# Patient Record
Sex: FEMALE | Race: WHITE | NOT HISPANIC OR LATINO | Employment: STUDENT | URBAN - METROPOLITAN AREA
[De-identification: names, ages, dates, MRNs, and addresses within clinical notes are randomized per-mention and may not be internally consistent; named-entity substitution may affect disease eponyms.]

---
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
## 2022-05-06 ENCOUNTER — TRANSCRIBE ORDERS (OUTPATIENT)
Dept: ADMINISTRATIVE | Facility: HOSPITAL | Age: 22
End: 2022-05-06

## 2022-05-06 DIAGNOSIS — Z01.818 OTHER SPECIFIED PRE-OPERATIVE EXAMINATION: Primary | ICD-10-CM

## 2022-05-13 ENCOUNTER — LAB (OUTPATIENT)
Dept: LAB | Facility: HOSPITAL | Age: 22
End: 2022-05-13

## 2022-05-13 DIAGNOSIS — Z01.818 OTHER SPECIFIED PRE-OPERATIVE EXAMINATION: ICD-10-CM

## 2022-05-13 LAB — SARS-COV-2 ORF1AB RESP QL NAA+PROBE: NOT DETECTED

## 2022-05-13 PROCEDURE — C9803 HOPD COVID-19 SPEC COLLECT: HCPCS

## 2022-05-13 PROCEDURE — U0004 COV-19 TEST NON-CDC HGH THRU: HCPCS

## 2022-05-16 ENCOUNTER — APPOINTMENT (OUTPATIENT)
Dept: GENERAL RADIOLOGY | Facility: HOSPITAL | Age: 22
End: 2022-05-16

## 2022-05-16 ENCOUNTER — HOSPITAL ENCOUNTER (OUTPATIENT)
Facility: HOSPITAL | Age: 22
Setting detail: SURGERY ADMIT
Discharge: HOME | End: 2022-05-16
Attending: ORTHOPAEDIC SURGERY | Admitting: ORTHOPAEDIC SURGERY

## 2022-05-16 ENCOUNTER — ANESTHESIA (OUTPATIENT)
Dept: PERIOP | Facility: HOSPITAL | Age: 22
End: 2022-05-16

## 2022-05-16 ENCOUNTER — ANESTHESIA EVENT (OUTPATIENT)
Dept: PERIOP | Facility: HOSPITAL | Age: 22
End: 2022-05-16

## 2022-05-16 VITALS
TEMPERATURE: 98.8 F | BODY MASS INDEX: 41.98 KG/M2 | WEIGHT: 251.99 LBS | DIASTOLIC BLOOD PRESSURE: 78 MMHG | RESPIRATION RATE: 16 BRPM | OXYGEN SATURATION: 99 % | SYSTOLIC BLOOD PRESSURE: 129 MMHG | HEART RATE: 90 BPM | HEIGHT: 65 IN

## 2022-05-16 DIAGNOSIS — M19.072 ARTHRITIS OF ANKLE, LEFT: Primary | ICD-10-CM

## 2022-05-16 PROCEDURE — C1713 ANCHOR/SCREW BN/BN,TIS/BN: HCPCS | Performed by: ORTHOPAEDIC SURGERY

## 2022-05-16 PROCEDURE — 73600 X-RAY EXAM OF ANKLE: CPT | Performed by: ORTHOPAEDIC SURGERY

## 2022-05-16 PROCEDURE — 25010000002 DEXAMETHASONE PER 1 MG: Performed by: ANESTHESIOLOGY

## 2022-05-16 PROCEDURE — 25010000002 NEOSTIGMINE 5 MG/10ML SOLUTION: Performed by: NURSE ANESTHETIST, CERTIFIED REGISTERED

## 2022-05-16 PROCEDURE — 25010000002 KETOROLAC TROMETHAMINE PER 15 MG: Performed by: ANESTHESIOLOGY

## 2022-05-16 PROCEDURE — 25010000002 FENTANYL CITRATE (PF) 50 MCG/ML SOLUTION: Performed by: ANESTHESIOLOGY

## 2022-05-16 PROCEDURE — 76000 FLUOROSCOPY <1 HR PHYS/QHP: CPT | Performed by: ORTHOPAEDIC SURGERY

## 2022-05-16 PROCEDURE — 25010000002 ONDANSETRON PER 1 MG: Performed by: NURSE ANESTHETIST, CERTIFIED REGISTERED

## 2022-05-16 PROCEDURE — 25010000002 HYDROMORPHONE PER 4 MG: Performed by: ANESTHESIOLOGY

## 2022-05-16 PROCEDURE — 25010000002 PROPOFOL 10 MG/ML EMULSION: Performed by: NURSE ANESTHETIST, CERTIFIED REGISTERED

## 2022-05-16 PROCEDURE — 25010000002 VANCOMYCIN 1 G RECONSTITUTED SOLUTION: Performed by: ORTHOPAEDIC SURGERY

## 2022-05-16 PROCEDURE — 25010000002 HYDROMORPHONE PER 4 MG: Performed by: NURSE ANESTHETIST, CERTIFIED REGISTERED

## 2022-05-16 PROCEDURE — 81025 URINE PREGNANCY TEST: CPT | Performed by: ANESTHESIOLOGY

## 2022-05-16 PROCEDURE — 25010000002 MIDAZOLAM PER 1 MG: Performed by: ANESTHESIOLOGY

## 2022-05-16 PROCEDURE — 25010000002 CEFAZOLIN IN DEXTROSE 2-4 GM/100ML-% SOLUTION: Performed by: ORTHOPAEDIC SURGERY

## 2022-05-16 PROCEDURE — 76942 ECHO GUIDE FOR BIOPSY: CPT | Performed by: ORTHOPAEDIC SURGERY

## 2022-05-16 PROCEDURE — 25010000002 FENTANYL CITRATE (PF) 50 MCG/ML SOLUTION: Performed by: NURSE ANESTHETIST, CERTIFIED REGISTERED

## 2022-05-16 PROCEDURE — 25010000002 ROPIVACAINE PER 1 MG: Performed by: ANESTHESIOLOGY

## 2022-05-16 PROCEDURE — 25010000002 DEXAMETHASONE PER 1 MG: Performed by: NURSE ANESTHETIST, CERTIFIED REGISTERED

## 2022-05-16 DEVICE — IMPLANTABLE DEVICE
Type: IMPLANTABLE DEVICE | Site: ANKLE | Status: FUNCTIONAL
Brand: ORTHOLOC 3DI

## 2022-05-16 DEVICE — IMPLANTABLE DEVICE
Type: IMPLANTABLE DEVICE | Site: ANKLE | Status: FUNCTIONAL
Brand: DARCO

## 2022-05-16 DEVICE — INJECTABLE KIT
Type: IMPLANTABLE DEVICE | Site: ANKLE | Status: FUNCTIONAL
Brand: AUGMENT® INJECTABLE

## 2022-05-16 DEVICE — ALLOGRFT FIBR OSTEOAMP SELECT 2.5CC: Type: IMPLANTABLE DEVICE | Site: ANKLE | Status: FUNCTIONAL

## 2022-05-16 DEVICE — IMPLANTABLE DEVICE
Type: IMPLANTABLE DEVICE | Site: ANKLE | Status: FUNCTIONAL
Brand: EASYFUSE™

## 2022-05-16 RX ORDER — SODIUM CHLORIDE 0.9 % (FLUSH) 0.9 %
3 SYRINGE (ML) INJECTION EVERY 12 HOURS SCHEDULED
Status: DISCONTINUED | OUTPATIENT
Start: 2022-05-16 | End: 2022-05-16 | Stop reason: HOSPADM

## 2022-05-16 RX ORDER — PROPOFOL 10 MG/ML
VIAL (ML) INTRAVENOUS AS NEEDED
Status: DISCONTINUED | OUTPATIENT
Start: 2022-05-16 | End: 2022-05-16 | Stop reason: SURG

## 2022-05-16 RX ORDER — GLYCOPYRROLATE 0.2 MG/ML
0.2 INJECTION INTRAMUSCULAR; INTRAVENOUS
Status: COMPLETED | OUTPATIENT
Start: 2022-05-16 | End: 2022-05-16

## 2022-05-16 RX ORDER — GLYCOPYRROLATE 0.2 MG/ML
INJECTION INTRAMUSCULAR; INTRAVENOUS AS NEEDED
Status: DISCONTINUED | OUTPATIENT
Start: 2022-05-16 | End: 2022-05-16 | Stop reason: SURG

## 2022-05-16 RX ORDER — HYDROMORPHONE HCL 110MG/55ML
PATIENT CONTROLLED ANALGESIA SYRINGE INTRAVENOUS AS NEEDED
Status: DISCONTINUED | OUTPATIENT
Start: 2022-05-16 | End: 2022-05-16 | Stop reason: SURG

## 2022-05-16 RX ORDER — KETOROLAC TROMETHAMINE 30 MG/ML
30 INJECTION, SOLUTION INTRAMUSCULAR; INTRAVENOUS ONCE
Status: COMPLETED | OUTPATIENT
Start: 2022-05-16 | End: 2022-05-16

## 2022-05-16 RX ORDER — LIDOCAINE HYDROCHLORIDE 10 MG/ML
0.5 INJECTION, SOLUTION EPIDURAL; INFILTRATION; INTRACAUDAL; PERINEURAL ONCE AS NEEDED
Status: DISCONTINUED | OUTPATIENT
Start: 2022-05-16 | End: 2022-05-16 | Stop reason: HOSPADM

## 2022-05-16 RX ORDER — ROCURONIUM BROMIDE 10 MG/ML
INJECTION, SOLUTION INTRAVENOUS AS NEEDED
Status: DISCONTINUED | OUTPATIENT
Start: 2022-05-16 | End: 2022-05-16 | Stop reason: SURG

## 2022-05-16 RX ORDER — SODIUM CHLORIDE 0.9 % (FLUSH) 0.9 %
3-10 SYRINGE (ML) INJECTION AS NEEDED
Status: DISCONTINUED | OUTPATIENT
Start: 2022-05-16 | End: 2022-05-16 | Stop reason: HOSPADM

## 2022-05-16 RX ORDER — HYDROCODONE BITARTRATE AND ACETAMINOPHEN 5; 325 MG/1; MG/1
1 TABLET ORAL ONCE AS NEEDED
Status: DISCONTINUED | OUTPATIENT
Start: 2022-05-16 | End: 2022-05-16 | Stop reason: HOSPADM

## 2022-05-16 RX ORDER — CEFAZOLIN SODIUM 2 G/100ML
2 INJECTION, SOLUTION INTRAVENOUS ONCE
Status: COMPLETED | OUTPATIENT
Start: 2022-05-16 | End: 2022-05-16

## 2022-05-16 RX ORDER — BUPIVACAINE HYDROCHLORIDE AND EPINEPHRINE 2.5; 5 MG/ML; UG/ML
INJECTION, SOLUTION EPIDURAL; INFILTRATION; INTRACAUDAL; PERINEURAL
Status: COMPLETED | OUTPATIENT
Start: 2022-05-16 | End: 2022-05-16

## 2022-05-16 RX ORDER — FENTANYL CITRATE 50 UG/ML
INJECTION, SOLUTION INTRAMUSCULAR; INTRAVENOUS AS NEEDED
Status: DISCONTINUED | OUTPATIENT
Start: 2022-05-16 | End: 2022-05-16 | Stop reason: SURG

## 2022-05-16 RX ORDER — OXYCODONE AND ACETAMINOPHEN 7.5; 325 MG/1; MG/1
1 TABLET ORAL EVERY 4 HOURS PRN
Qty: 40 TABLET | Refills: 0 | Status: SHIPPED | OUTPATIENT
Start: 2022-05-16 | End: 2023-05-16

## 2022-05-16 RX ORDER — FENTANYL CITRATE 50 UG/ML
100 INJECTION, SOLUTION INTRAMUSCULAR; INTRAVENOUS
Status: DISCONTINUED | OUTPATIENT
Start: 2022-05-16 | End: 2022-05-16 | Stop reason: HOSPADM

## 2022-05-16 RX ORDER — ONDANSETRON 2 MG/ML
INJECTION INTRAMUSCULAR; INTRAVENOUS AS NEEDED
Status: DISCONTINUED | OUTPATIENT
Start: 2022-05-16 | End: 2022-05-16 | Stop reason: SURG

## 2022-05-16 RX ORDER — ASPIRIN 325 MG
325 TABLET, DELAYED RELEASE (ENTERIC COATED) ORAL DAILY
Qty: 30 TABLET | Refills: 0 | Status: SHIPPED | OUTPATIENT
Start: 2022-05-16

## 2022-05-16 RX ORDER — VANCOMYCIN HYDROCHLORIDE 1 G/20ML
INJECTION, POWDER, LYOPHILIZED, FOR SOLUTION INTRAVENOUS AS NEEDED
Status: DISCONTINUED | OUTPATIENT
Start: 2022-05-16 | End: 2022-05-16 | Stop reason: HOSPADM

## 2022-05-16 RX ORDER — FLUMAZENIL 0.1 MG/ML
0.2 INJECTION INTRAVENOUS AS NEEDED
Status: DISCONTINUED | OUTPATIENT
Start: 2022-05-16 | End: 2022-05-16 | Stop reason: HOSPADM

## 2022-05-16 RX ORDER — SODIUM CHLORIDE, SODIUM LACTATE, POTASSIUM CHLORIDE, CALCIUM CHLORIDE 600; 310; 30; 20 MG/100ML; MG/100ML; MG/100ML; MG/100ML
9 INJECTION, SOLUTION INTRAVENOUS CONTINUOUS
Status: DISCONTINUED | OUTPATIENT
Start: 2022-05-16 | End: 2022-05-16 | Stop reason: HOSPADM

## 2022-05-16 RX ORDER — FENTANYL CITRATE 50 UG/ML
50 INJECTION, SOLUTION INTRAMUSCULAR; INTRAVENOUS
Status: DISCONTINUED | OUTPATIENT
Start: 2022-05-16 | End: 2022-05-16 | Stop reason: HOSPADM

## 2022-05-16 RX ORDER — ONDANSETRON 2 MG/ML
4 INJECTION INTRAMUSCULAR; INTRAVENOUS ONCE AS NEEDED
Status: DISCONTINUED | OUTPATIENT
Start: 2022-05-16 | End: 2022-05-16 | Stop reason: HOSPADM

## 2022-05-16 RX ORDER — DEXAMETHASONE SODIUM PHOSPHATE 10 MG/ML
INJECTION INTRAMUSCULAR; INTRAVENOUS AS NEEDED
Status: DISCONTINUED | OUTPATIENT
Start: 2022-05-16 | End: 2022-05-16 | Stop reason: SURG

## 2022-05-16 RX ORDER — HYDROMORPHONE HYDROCHLORIDE 1 MG/ML
0.5 INJECTION, SOLUTION INTRAMUSCULAR; INTRAVENOUS; SUBCUTANEOUS
Status: DISCONTINUED | OUTPATIENT
Start: 2022-05-16 | End: 2022-05-16 | Stop reason: HOSPADM

## 2022-05-16 RX ORDER — NEOSTIGMINE METHYLSULFATE 0.5 MG/ML
INJECTION, SOLUTION INTRAVENOUS AS NEEDED
Status: DISCONTINUED | OUTPATIENT
Start: 2022-05-16 | End: 2022-05-16 | Stop reason: SURG

## 2022-05-16 RX ORDER — DEXAMETHASONE SODIUM PHOSPHATE 4 MG/ML
INJECTION, SOLUTION INTRA-ARTICULAR; INTRALESIONAL; INTRAMUSCULAR; INTRAVENOUS; SOFT TISSUE
Status: COMPLETED | OUTPATIENT
Start: 2022-05-16 | End: 2022-05-16

## 2022-05-16 RX ORDER — ROPIVACAINE HYDROCHLORIDE 5 MG/ML
INJECTION, SOLUTION EPIDURAL; INFILTRATION; PERINEURAL
Status: COMPLETED | OUTPATIENT
Start: 2022-05-16 | End: 2022-05-16

## 2022-05-16 RX ORDER — LIDOCAINE HYDROCHLORIDE 20 MG/ML
INJECTION, SOLUTION INFILTRATION; PERINEURAL AS NEEDED
Status: DISCONTINUED | OUTPATIENT
Start: 2022-05-16 | End: 2022-05-16 | Stop reason: SURG

## 2022-05-16 RX ORDER — MIDAZOLAM HYDROCHLORIDE 1 MG/ML
2 INJECTION INTRAMUSCULAR; INTRAVENOUS
Status: DISCONTINUED | OUTPATIENT
Start: 2022-05-16 | End: 2022-05-16 | Stop reason: HOSPADM

## 2022-05-16 RX ORDER — HYDROCODONE BITARTRATE AND ACETAMINOPHEN 7.5; 325 MG/1; MG/1
1 TABLET ORAL EVERY 4 HOURS PRN
Status: DISCONTINUED | OUTPATIENT
Start: 2022-05-16 | End: 2022-05-16 | Stop reason: HOSPADM

## 2022-05-16 RX ORDER — MAGNESIUM HYDROXIDE 1200 MG/15ML
LIQUID ORAL AS NEEDED
Status: DISCONTINUED | OUTPATIENT
Start: 2022-05-16 | End: 2022-05-16 | Stop reason: HOSPADM

## 2022-05-16 RX ORDER — ONDANSETRON 4 MG/1
4 TABLET, FILM COATED ORAL EVERY 8 HOURS PRN
Qty: 20 TABLET | Refills: 0 | Status: SHIPPED | OUTPATIENT
Start: 2022-05-16 | End: 2022-06-15

## 2022-05-16 RX ORDER — EPHEDRINE SULFATE 50 MG/ML
5 INJECTION, SOLUTION INTRAVENOUS ONCE AS NEEDED
Status: DISCONTINUED | OUTPATIENT
Start: 2022-05-16 | End: 2022-05-16 | Stop reason: HOSPADM

## 2022-05-16 RX ADMIN — GLYCOPYRROLATE 0.2 MG: 0.2 INJECTION INTRAMUSCULAR; INTRAVENOUS at 08:59

## 2022-05-16 RX ADMIN — FENTANYL CITRATE 25 MCG: 50 INJECTION INTRAMUSCULAR; INTRAVENOUS at 10:52

## 2022-05-16 RX ADMIN — FENTANYL CITRATE 50 MCG: 0.05 INJECTION, SOLUTION INTRAMUSCULAR; INTRAVENOUS at 13:03

## 2022-05-16 RX ADMIN — LIDOCAINE HYDROCHLORIDE 100 MG: 20 INJECTION, SOLUTION INFILTRATION; PERINEURAL at 10:45

## 2022-05-16 RX ADMIN — FENTANYL CITRATE 50 MCG: 0.05 INJECTION, SOLUTION INTRAMUSCULAR; INTRAVENOUS at 09:28

## 2022-05-16 RX ADMIN — KETOROLAC TROMETHAMINE 30 MG: 30 INJECTION, SOLUTION INTRAMUSCULAR at 13:04

## 2022-05-16 RX ADMIN — SODIUM CHLORIDE, POTASSIUM CHLORIDE, SODIUM LACTATE AND CALCIUM CHLORIDE 9 ML/HR: 600; 310; 30; 20 INJECTION, SOLUTION INTRAVENOUS at 08:41

## 2022-05-16 RX ADMIN — BUPIVACAINE HYDROCHLORIDE AND EPINEPHRINE 5 ML: 2.5; 5 INJECTION, SOLUTION EPIDURAL; INFILTRATION; INTRACAUDAL; PERINEURAL at 09:38

## 2022-05-16 RX ADMIN — ONDANSETRON 4 MG: 2 INJECTION INTRAMUSCULAR; INTRAVENOUS at 12:12

## 2022-05-16 RX ADMIN — HYDROMORPHONE HYDROCHLORIDE 0.5 MG: 1 INJECTION, SOLUTION INTRAMUSCULAR; INTRAVENOUS; SUBCUTANEOUS at 13:53

## 2022-05-16 RX ADMIN — ROPIVACAINE HYDROCHLORIDE 20 ML: 5 INJECTION EPIDURAL; INFILTRATION; PERINEURAL at 09:44

## 2022-05-16 RX ADMIN — GLYCOPYRROLATE 0.4 MG: 0.2 INJECTION INTRAMUSCULAR; INTRAVENOUS at 12:15

## 2022-05-16 RX ADMIN — HYDROMORPHONE HYDROCHLORIDE 0.25 MG: 2 INJECTION, SOLUTION INTRAMUSCULAR; INTRAVENOUS; SUBCUTANEOUS at 12:22

## 2022-05-16 RX ADMIN — PROPOFOL 250 MG: 10 INJECTION, EMULSION INTRAVENOUS at 10:45

## 2022-05-16 RX ADMIN — FENTANYL CITRATE 25 MCG: 50 INJECTION INTRAMUSCULAR; INTRAVENOUS at 11:05

## 2022-05-16 RX ADMIN — HYDROMORPHONE HYDROCHLORIDE 0.25 MG: 2 INJECTION, SOLUTION INTRAMUSCULAR; INTRAVENOUS; SUBCUTANEOUS at 12:28

## 2022-05-16 RX ADMIN — ROCURONIUM BROMIDE 35 MG: 50 INJECTION INTRAVENOUS at 10:45

## 2022-05-16 RX ADMIN — MIDAZOLAM 2 MG: 1 INJECTION, SOLUTION INTRAMUSCULAR; INTRAVENOUS at 09:28

## 2022-05-16 RX ADMIN — DEXAMETHASONE SODIUM PHOSPHATE 8 MG: 10 INJECTION INTRAMUSCULAR; INTRAVENOUS at 10:49

## 2022-05-16 RX ADMIN — FENTANYL CITRATE 50 MCG: 0.05 INJECTION, SOLUTION INTRAMUSCULAR; INTRAVENOUS at 13:20

## 2022-05-16 RX ADMIN — BUPIVACAINE HYDROCHLORIDE AND EPINEPHRINE BITARTRATE 10 ML: 2.5; .005 INJECTION, SOLUTION EPIDURAL; INFILTRATION; INTRACAUDAL; PERINEURAL at 09:44

## 2022-05-16 RX ADMIN — FENTANYL CITRATE 50 MCG: 50 INJECTION INTRAMUSCULAR; INTRAVENOUS at 10:44

## 2022-05-16 RX ADMIN — NEOSTIGMINE METHYLSULFATE 3 MG: 0.5 INJECTION INTRAVENOUS at 12:15

## 2022-05-16 RX ADMIN — DEXAMETHASONE SODIUM PHOSPHATE 4 MG: 4 INJECTION, SOLUTION INTRAMUSCULAR; INTRAVENOUS at 09:44

## 2022-05-16 RX ADMIN — HYDROCODONE BITARTRATE AND ACETAMINOPHEN 1 TABLET: 7.5; 325 TABLET ORAL at 13:35

## 2022-05-16 RX ADMIN — CEFAZOLIN SODIUM 2 G: 2 INJECTION, SOLUTION INTRAVENOUS at 10:37

## 2022-05-16 RX ADMIN — ROPIVACAINE HYDROCHLORIDE 10 ML: 5 INJECTION, SOLUTION EPIDURAL; INFILTRATION; PERINEURAL at 09:38

## 2022-05-16 NOTE — ANESTHESIA POSTPROCEDURE EVALUATION
Patient: Kyung Collazo    Procedure Summary     Date: 05/16/22 Room / Location:  [facility]  [facility]    Anesthesia Start: 1040 Anesthesia Stop: 1243    Procedure: LEFT ANKLE ARTHRODESIS, CALCANEL BONE GRAFT (Left Ankle) Diagnosis:     Surgeons: Joni Cho Jr., MD Provider: Joni Esparza MD    Anesthesia Type: general ASA Status: 3          Anesthesia Type: general    Vitals  Vitals Value Taken Time   /75 05/16/22 1401   Temp 36.9 °C (98.4 °F) 05/16/22 1240   Pulse 79 05/16/22 1408   Resp 16 05/16/22 1400   SpO2 98 % 05/16/22 1409   Vitals shown include unvalidated device data.        Post Anesthesia Care and Evaluation    Patient location during evaluation: bedside  Patient participation: complete - patient participated  Level of consciousness: awake  Pain management: adequate  Airway patency: patent  Anesthetic complications: No anesthetic complications  PONV Status: controlled  Cardiovascular status: acceptable  Respiratory status: acceptable  Hydration status: acceptable    Comments: --------------------            05/16/22               1400     --------------------   BP:       102/75     Pulse:     103       Resp:       16       Temp:                SpO2:      97%      --------------------

## 2022-05-16 NOTE — ANESTHESIA PROCEDURE NOTES
Airway  Urgency: elective    Date/Time: 5/16/2022 10:48 AM  Airway not difficult    General Information and Staff    Patient location during procedure: OR  Anesthesiologist: Joni Esparza MD  CRNA/CAA: Maria Luisa Hutchison CRNA    Indications and Patient Condition  Indications for airway management: airway protection    Preoxygenated: yes  MILS not maintained throughout  Mask difficulty assessment: 2 - vent by mask + OA or adjuvant +/- NMBA    Final Airway Details  Final airway type: endotracheal airway      Successful airway: ETT  Cuffed: yes   Successful intubation technique: direct laryngoscopy  Facilitating devices/methods: intubating stylet  Endotracheal tube insertion site: oral  Blade: Esteban  ETT size (mm): 7.0  Cormack-Lehane Classification: grade I - full view of glottis  Placement verified by: chest auscultation and capnometry   Measured from: lips  ETT/EBT  to lips (cm): 22  Number of attempts at approach: 1  Assessment: lips, teeth, and gum same as pre-op and atraumatic intubation

## 2022-05-16 NOTE — DISCHARGE INSTRUCTIONS
What to expect after a Nerve Block    Nerve blocks administered to block pain affect many types of nerves, including those nerves that control movement, pain, and normal sensation. Following a nerve block, you may notice some bruising at the site where the block was given. You may experience sensations such as: numbness of the affected area or limb, tingling, heaviness (that is the limb feels heavy to you), weakness or inability to move the affected arm or leg, or a feeling as if your arm or leg has “fallen asleep.”     A nerve block can last from 2 to 36 hours depending on the medications used.  Usually the weakness wears off first followed by the tingling and heaviness. As the block wears off, you may begin to notice pain; however, this sequence of events may occur in any order. Typically, you will be able to move your limb before you will feel it. Once a nerve block begins to wear off, the effects are usually completely gone within 60 minutes.  If you experience continued side effects that you believe are block related for longer than 48 hours, please call your healthcare provider. Please see block-specific instructions below.    Instructions for any Block involving the leg/foot:   If you have had a leg /foot block, you should not bear weight on the affected leg until the block has worn off. After the block has worn off, weight bearing should be as directed by your surgeon. You may be sent home with crutches. You are at high risk for falling because of the anesthetic effects on your leg. Please use caution when standing or trying to move or walk. Have someone assist you until your leg and foot function have returned to normal.     Protection of a “blocked” limb  After a nerve block, you cannot feel pain, pressure, or extremes of temperature in the affected limb. And because of this, your blocked limb is at more risk for injury. For example, it is possible to burn your limb on an extremely hot surface without  feeling it.     When resting, it is important to reposition your limb periodically to avoid prolonged pressure on it. This may require the use of pillows and padding.    While sleeping, you should avoid rolling onto the affected limb or putting too much pressure on it.     If you have a cast or tight dressing, check the color of your fingers or toes of the affected limb. Call your surgeon if they look discolored (that is, dusky, dark colored).    Use caution in cold weather. Cover your limb appropriately to protect it from the cold.    Pain Management:  Your surgeon will give you a prescription for pain medication. Begin taking this before the nerve block wears off. Bear in mind that sometimes the block can wear off in the middle of the night.

## 2022-05-16 NOTE — ANESTHESIA PREPROCEDURE EVALUATION
Anesthesia Evaluation     no history of anesthetic complications:  NPO Solid Status: > 8 hours  NPO Liquid Status: > 2 hours           Airway   Mallampati: III  Neck ROM: full  no difficulty expected  Dental - normal exam     Pulmonary - normal exam   (+) asthma (allergy-induced),  (-) COPD, sleep apnea, not a smoker    PE comment: nonlabored  Cardiovascular - negative cardio ROS and normal exam    Rhythm: regular  Rate: normal    (-) hypertension, valvular problems/murmurs, past MI, CAD, dysrhythmias, angina      Neuro/Psych- negative ROS  (-) seizures, TIA, CVA  GI/Hepatic/Renal/Endo    (+) morbid obesity,    (-) GERD, liver disease, no renal disease, diabetes, no thyroid disorder    Musculoskeletal (-) negative ROS        ROS comment: Retained hardward  H/o Ankle fracture  Abdominal    Substance History      OB/GYN          Other                          Anesthesia Plan    ASA 3     general     intravenous induction     Anesthetic plan, all risks, benefits, and alternatives have been provided, discussed and informed consent has been obtained with: patient.

## 2022-05-16 NOTE — INTERVAL H&P NOTE
H&P reviewed. The patient was examined and there are no changes to the H&P.        The risks/benefits of surgery, including pain, infection, wound healing problems, need for future procedures, mal/nonunion, DVT/PE, cardiac event, and/or death were discussed, and the patient elected to proceed with surgery.

## 2022-05-16 NOTE — ANESTHESIA PROCEDURE NOTES
Peripheral Block    Pre-sedation assessment completed: 5/16/2022 9:38 AM    Patient reassessed immediately prior to procedure    Patient location during procedure: pre-op  Start time: 5/16/2022 9:38 AM  Stop time: 5/16/2022 9:43 AM  Reason for block: at surgeon's request and post-op pain management  Performed by  Anesthesiologist: Joni Esparza MD  Preanesthetic Checklist  Completed: patient identified, IV checked, site marked, risks and benefits discussed, surgical consent, monitors and equipment checked, pre-op evaluation and timeout performed  Prep:  Pt Position: supine  Sterile barriers:cap, gloves, mask and sterile barriers  Prep: ChloraPrep  Patient monitoring: blood pressure monitoring, continuous pulse oximetry and EKG  Procedure    Sedation: yes  Performed under: local infiltration  Guidance:ultrasound guided    ULTRASOUND INTERPRETATION.  Using ultrasound guidance a 22 G gauge needle was placed in close proximity to the sciatic nerve, at which point, under ultrasound guidance anesthetic was injected in the area of the nerve and spread of the anesthesia was seen on ultrasound in close proximity thereto.  There were no abnormalities seen on ultrasound; a digital image was taken; and the patient tolerated the procedure with no complications. Images:still images obtained, printed/placed on chart (U/S used to localize the nerve)    Laterality:left  Block Type:popliteal and sciatic  Injection Technique:single-shot  Needle Type:echogenic  Needle Gauge:22 G  Resistance on Injection: less than 15 psi    Medications Used: dexamethasone (DECADRON) injection, 4 mg  ropivacaine (NAROPIN) 0.5 % injection, 20 mL  bupivacaine-EPINEPHrine PF (MARCAINE w/EPI) 0.25% -[id number] injection, 10 mL  Med administered at 5/16/2022 9:44 AM      Post Assessment  Injection Assessment: negative aspiration for heme, no paresthesia on injection and incremental injection  Patient Tolerance:comfortable throughout  block  Complications:no  Additional Notes

## 2022-05-16 NOTE — H&P
[facility]   HISTORY AND PHYSICAL    Patient Name: Kyung Collazo  : 2000  MRN: [id number]  Primary Care Physician:  Katrin Staton MD  Date of admission: 2022    Subjective   Subjective     Chief Complaint: Left ankle pain    History of Present Illness     The patient is a pleasant 21-year-old female whose had progressive pain and dysfunction related to left ankle posttraumatic arthritis.  This was refractory to prolonged nonoperative management.  The patient requested surgical intervention.  She presents today for elective left ankle arthrodesis.  Please refer to office H&P for full details.  No significant change in history, exam, plan.    Review of Systems   Review of Systems:  Constitutional: Negative  HENT: Negative  Eyes: Negative  Respiratory: Negative; no shortness of breath  Cardiovascular: Negative; no current chest pain  Gastrointestinal: Negative  : Negative  Skin: Negative except as listed in HPI  Neurological: Negative, no numbness or deficits  Hematological: Negative  Muscoloskeletal: Per HPI                   Personal History     Past Medical History:   Diagnosis Date   • Allergic rhinitis    • Ankle fracture 2020   • Anxiety and depression    • Asthma     PT DENIES DIAG.. STATED IT WAS ALLERGIES   • Borderline anemia    • Presence of retained hardware        Past Surgical History:   Procedure Laterality Date   • ANKLE ARTHROSCOPY Left 2021    Procedure: ANKLE ARTHROSCOPY/DEBRIDMENT REVISION DELTOID RECONSTRUCTION WITH ALLOGRAFT;  Surgeon: Joni Cho Jr., MD;  Location: [facility];  Service: Orthopedics;  Laterality: Left;   • ANKLE OPEN REDUCTION INTERNAL FIXATION Left 2021    Procedure: LEFT REMOVAL OF HARDWARE ANKLE REVISION OPEN REDUCTION INTERNAL FIXATION SYNDESMOSIS;  Surgeon: Joni Cho Jr., MD;  Location: [facility] [place];  Service: Orthopedics;  Laterality: Left;   • ANKLE OPEN REDUCTION INTERNAL FIXATION Left 2021    Procedure: LEFT  REMOVAL OF SYNDESMOSIS SCREW REVISION OPEN REDUCTION INTERNAL FIXATION SYNDESMOSIS (30MIN);  Surgeon: Joni Cho Jr., MD;  Location: [facility];  Service: Orthopedics;  Laterality: Left;   • APPENDECTOMY     • BLADDER SURGERY      REFLUX. RESOLVED   • BLADDER SURGERY     • ORIF ANKLE FRACTURE  12/31/2020    [facility] [place]    • URETERAL REIMPLANTATION         Family History: family history includes No Known Problems in her father and mother. Otherwise pertinent FHx was reviewed and not pertinent to current issue.    Social History:  reports that she has never smoked. She has never used smokeless tobacco. She reports that she does not drink alcohol and does not use drugs.    Home Medications:  cetirizine, escitalopram, ibuprofen, and oxyCODONE-acetaminophen    Allergies:  No Known Allergies    Objective    Objective     Vitals:   Temp:  [97.9 °F (36.6 °C)] 97.9 °F (36.6 °C)  Heart Rate:  [88] 88  Resp:  [16] 16  BP: (101)/(76) 101/76    Physical Exam  Physical Exam:  Vital signs reviewed.  Constitutional:  Appears well-developed, well nourished.  HEENT:  Head: normocephalic, atraumatic  Eyes: EOMI, grossly normal.  No scleral icterus.  Neck: Normal range of motion.  Supple, no tracheal deviation.  Cardiovascular: Regular rate.  Pulmonary: Effort normal, symmetric chest expansion, no labored breathing.  Abdominal: Soft, non distended  Neurological: No gross deficits, oriented to person, place, and time.  Skin: Warm and dry  Psychiatric: Normal mood and affect  Musculoskeletal:  Left    Active ankle dorsiflexion and plantarflexion.  Sensation is intact to light touch in sural, saphenous, deep and superficial peroneal, tibial nerve distribution.  Toes are warm and well perfused with brisk capillary refill.             Assessment & Plan   Assessment / Plan     The patient is a pleasant 21-year-old female whose had progressive pain and dysfunction related to left ankle posttraumatic arthritis.  This was  refractory to prolonged nonoperative management.  The patient requested surgical intervention.  She presents today for elective left ankle arthrodesis with calcaneal bone graft.  Please refer to office H&P for full details.  No significant change in history, exam, plan.    The risks/benefits of surgery, including pain, infection, wound healing problems, need for future procedures, mal/nonunion, DVT/PE, cardiac event, and/or death were discussed, and the patient elected to proceed with surgery.      Joni Cho Jr, MD

## 2022-05-16 NOTE — NURSING NOTE
PT ARRIVED FROM PACU VERY EMOTIONAL AND TEARFUL.  PT C/O FEELING ITCHY ALL OVER, COLD RAG WAS OFFERED AND PT STATED THAT IT GAVE HER SOME RELIEF.  ONCE PT HAD CALMED DOWN, SHE EXPRESSED SHE WAS SLEEPY STILL.  HOB WAS LOWERED AND PT SLEPT FOR 30 MINUTES.

## 2022-05-16 NOTE — OP NOTE
Procedure Note    Kyung Collazo  5/16/2022    Pre-op Diagnosis:   1.  Severe left ankle posttraumatic arthritis       Post-Op Diagnosis Codes:  Same    Procedure:  1.  Left ankle arthrodesis  2.  Calcaneal bone graft, large, separate incision    Surgeon(s):  Joni Cho Jr., MD    Assistants:  None    Anesthesia: General with Block    Estimated Blood Loss: minimal    Specimens:                None      Drains: * No LDAs found *    Complications:   None apparent    Disposition:  Stable to PACU for recovery indications for procedure:    Indications for procedure:  The patient is a very pleasant 21-year-old female who developed severe posttraumatic arthritis of the left ankle following a severe ankle fracture.  This was refractory to appropriate nonoperative management.  The patient requested surgical intervention.  The above listed procedures were recommended.    Procedure in detail:  The correct patient was identified in preoperative holding.  All risks and benefits of surgery were again discussed in detail, and the patient agreed to proceed with surgery.  The operative extremity was confirmed and marked by myself.  Operative consent reviewed and confirmed to be signed by the patient and myself.    At this time, the patient was wheeled to the operative theatre and placed supine on the OR table.  SHEILA/SCD placed on nonoperative leg. Anesthesia was induced smoothly by our anesthesia colleagues.   Well padded nonsterile tourniquet placed on the upper thigh. The left lower extremity was prepped and draped in standard sterile fashion.  Appropriate presurgical timeout was performed, confirming correct patient, correct extremity, correct procedure, availability of sterile instruments/implants, and the administration of intravenous antibiotics in the form of Ancef within one hour of skin incision.      A longitudinal incision is made over the anterior ankle with a #15 blade and careful subcutaneous dissection carried  down to the retinacular layer.  Any branches of the superficial peroneal nerve are carefully identified and protected.  The tibialis anterior and EHL tendons are identified.  The retinaculum/fascia layer is opened in longitudinal fashion in the TA/EHL interval.  Full thickness medial and lateral subperiosteal flaps are elevated, osteophytes off the anterior ankle are removed with rongeur and osteotomes.  The anterior tibial suture button from her prior deltoid allograft reconstruction was removed.  The deep anterior neurovascular bundle is carefully protected throughout the case.  A distractor is placed across the ankle.  The ankle joint is exposed, revealing diffuse degenerative changes.      Residual cartilage is removed with curettes and osteotomes to expose the subchondral plate on the tibial and talar sides as well as in the medial and lateral gutters.  The joint is thoroughly irrigated out of any debris and the subchondral bone is prepared with multiple drill holes and feathering technique with a small curved osteotome.       A small oblique incision was made at the lateral heel and dissection carried down to the lateral wall of the calcaneus, avoiding any branches of the sural nerve.  A significant amount of cancellous, calcaneal bone graft was harvested via a unicortical window with the AcTreeRingd bone graft harvesting reamer system.  A piece of gelfoam sponge was placed in the harvest site of the calcaneus and this incision was closed with 3.0 Biosyn in the subcutaneous tissue and interrupted Nylon stitches in the skin.    The calcaneal bone autograft was mixed with [employer]ment and OsteoAmp bone graft and carefully placed in the arthrodesis site.    The ankle joint was reduced and preliminarily pinned in the appropriate position for arthrodesis in terms of neutral dorsiflexion-plantarflexion, neutral coronal plane alignment allowing physiologic hindfoot valgus, and proper rotation.  Intraoperative  fluoroscopy in the AP and lateral views was independently interpreted and used to confirm appropriate alignment, as well as appropriate osseous apposition without any talar translation in the sagittal plane.    A guide wire for a WMT 6.5 mm cannulated, partially-threaded screw was placed from the distal medial tibial flare into the talar body.  Fluoroscopy was used to confirm appropriate position of the guide wire in all views.  After measuring and overdrilling, a headed-screw of the appropriate length is placed, achieving excellent compression and purchase.  The anterior tibiotalar osseous surface is carefully contoured for anterior plate placement.  A [employer] EasyFuse nitinol compression staple is measured, drilled, and placed across the lateral tibiotalar joint with excellent compression and maintained alignment.      A T Ortholoc anterior-type ankle fusion plate was selected and temporarily pinned.  Fluoroscopic AP, mortise, and lateral views confirmed appropriate plate position, therefore it was fixated distally in the talus with 3.5mm locking screws and in the tibia with 4.5 mm nonlocking screws, including a 4.5 mm screw in the compression slot.  Excellent alignment was noted clinically and fluoroscopically.  Final fluoroscopic views, including AP/mortise/lateral views of the ankle revealed all hardware to be in appropriate position and of appropriate length/size.      All wounds were thoroughly irrigated and closed in layered fashion with 2-0 Vicryl in the retinaculum, 3-0 Vicryl in the subcutaneous layers, and a combination of staples and 3-0 Nylon on the skin in interrupted fashion.  The skin was cleaned and dried and a sterile dressing applied, followed by a well-padded, short leg splint (posterior slab and stirrup).  The tourniquet had been released and brisk capillary refill returned to all toes.  The patient was awoken from anesthesia without apparent complication and taken to PACU for  "recovery.    Postoperative Plan:  Weightbearing status: Non-weightbearing in the splint  DVT Prophylaxis: Aspirin 325mg daily;  Patient will be instructed to elevate as much as possible (\"toes above the nose\") and to get up and move around at least once per hour while awake to help minimize risk of blood clots.        Joni Cho Jr, MD     Date: 5/16/2022  Time: 12:53 EDT        "

## 2024-05-02 NOTE — ANESTHESIA PROCEDURE NOTES
May 2, 2024  EMPLOYEE INFORMATION: EMPLOYER INFORMATION:   NAME: [employer]   : 1973 [phone]    DATE OF INJURY/EVENT: 2024           [facility]   Treating Provider: Amanda Chin MD  Time In:  8:57 AM Time Out:  12:32 PM      DIAGNOSIS:   1. Cellulitis of right leg      STATUS: This injury is undetermined as to causation at this time.    RETURN TO WORK:                 RESTRICTIONS: Restrictions are to be followed at work and at home.  Restrictions are in effect until next follow-up visit.  Referred to the Emergency Department for further evaluation. Cannot make any determination of work status until he is further evaluated and treated.     TREATMENT PLAN:   Medications for this injury/condition: None   Referral/Consult: None  Diagnostic Testing: None       Instructions: None     NEXT RETURN VISIT:  To be determined  Thank you for the privilege of providing medical care for this injury/condition.  If there are any questions, please call the occupational health clinic at Dept: [phone].      Electronically signed on 2024 at 12:32 PM by:   Amanda Chin MD   [facility]       Peripheral Block    Pre-sedation assessment completed: 5/16/2022 9:33 AM    Patient reassessed immediately prior to procedure    Patient location during procedure: pre-op  Start time: 5/16/2022 9:33 AM  Stop time: 5/16/2022 9:38 AM  Reason for block: at surgeon's request and post-op pain management  Performed by  Anesthesiologist: Joni Esparza MD  Preanesthetic Checklist  Completed: patient identified, IV checked, site marked, risks and benefits discussed, surgical consent, monitors and equipment checked, pre-op evaluation and timeout performed  Prep:  Pt Position: supine  Sterile barriers:cap, gloves, mask and sterile barriers  Prep: ChloraPrep  Patient monitoring: blood pressure monitoring, continuous pulse oximetry and EKG  Procedure    Sedation: yes  Performed under: local infiltration  Guidance:ultrasound guided    ULTRASOUND INTERPRETATION. Using ultrasound guidance a 22 G gauge needle was placed in close proximity to the nerve, at which point, under ultrasound guidance anesthetic was injected in the area of the nerve and spread of the anesthesia was seen on ultrasound in close proximity thereto.  There were no abnormalities seen on ultrasound; a digital image was taken; and the patient tolerated the procedure with no complications. Images:still images obtained, printed/placed on chart (U/S to localize the nerve)    Laterality:left  Block Type:adductor canal block  Injection Technique:single-shot  Needle Type:echogenic  Needle Gauge:22 G  Resistance on Injection: less than 15 psi    Medications Used: ropivacaine (NAROPIN) 0.5 % injection, 10 mL  bupivacaine-EPINEPHrine PF (MARCAINE w/EPI) 0.25% -[id number] injection, 5 mL  Med administered at 5/16/2022 9:38 AM      Post Assessment  Injection Assessment: negative aspiration for heme, no paresthesia on injection and incremental injection  Patient Tolerance:comfortable throughout block  Complications:yes and no

## 2024-10-18 ENCOUNTER — OFFICE VISIT (OUTPATIENT)
Dept: FAMILY MEDICINE CLINIC | Facility: CLINIC | Age: 24
End: 2024-10-18
Payer: COMMERCIAL

## 2024-10-18 VITALS
HEART RATE: 92 BPM | OXYGEN SATURATION: 99 % | SYSTOLIC BLOOD PRESSURE: 100 MMHG | WEIGHT: 258.2 LBS | HEIGHT: 65 IN | BODY MASS INDEX: 43.02 KG/M2 | RESPIRATION RATE: 18 BRPM | DIASTOLIC BLOOD PRESSURE: 70 MMHG

## 2024-10-18 DIAGNOSIS — R41.840 ATTENTION AND CONCENTRATION DEFICIT: ICD-10-CM

## 2024-10-18 DIAGNOSIS — Z00.00 GENERAL MEDICAL EXAM: Primary | ICD-10-CM

## 2024-10-18 DIAGNOSIS — E66.01 CLASS 3 SEVERE OBESITY DUE TO EXCESS CALORIES WITHOUT SERIOUS COMORBIDITY WITH BODY MASS INDEX (BMI) OF 40.0 TO 44.9 IN ADULT: ICD-10-CM

## 2024-10-18 DIAGNOSIS — F41.9 ANXIETY: ICD-10-CM

## 2024-10-18 DIAGNOSIS — E66.813 CLASS 3 SEVERE OBESITY DUE TO EXCESS CALORIES WITHOUT SERIOUS COMORBIDITY WITH BODY MASS INDEX (BMI) OF 40.0 TO 44.9 IN ADULT: ICD-10-CM

## 2024-10-18 DIAGNOSIS — N83.202 BILATERAL OVARIAN CYSTS: ICD-10-CM

## 2024-10-18 DIAGNOSIS — N83.201 BILATERAL OVARIAN CYSTS: ICD-10-CM

## 2024-10-18 PROBLEM — G89.29 CHRONIC PAIN OF LEFT ANKLE: Status: ACTIVE | Noted: 2024-10-18

## 2024-10-18 PROBLEM — M25.572 CHRONIC PAIN OF LEFT ANKLE: Status: ACTIVE | Noted: 2024-10-18

## 2024-10-18 PROBLEM — N83.209 OVARIAN CYST: Status: ACTIVE | Noted: 2019-03-15

## 2024-10-18 RX ORDER — SEMAGLUTIDE 0.25 MG/.5ML
0.25 INJECTION, SOLUTION SUBCUTANEOUS WEEKLY
Qty: 2 ML | Refills: 0 | Status: SHIPPED | OUTPATIENT
Start: 2024-10-18

## 2024-10-18 RX ORDER — BUPROPION HYDROCHLORIDE 150 MG/1
TABLET ORAL
Qty: 120 TABLET | Refills: 0 | Status: SHIPPED | OUTPATIENT
Start: 2024-10-18 | End: 2024-12-19

## 2024-10-18 NOTE — ASSESSMENT & PLAN NOTE
First ovarian cysts at 17. Experiencing mid-cycle severe pain and heavy menstrual bleeding. Stopped OTC progestin only OCP in August after a few months - it improved bleeding but she did experience irregularity. Has to change menstrual cup 4-5 times daily on heavy cycle days.     Also experiences hirsutism.    Check testosterone, obtain pelvic ultrasound.  History does seem consistent with polycystic ovarian syndrome.

## 2024-10-18 NOTE — PROGRESS NOTES
"Chief Complaint  Annual Exam    Subjective        Kyung Collazo presents to [facility]  History of Present Illness  23-year-old female presents to clinic to establish care.  Main concern today is weight and ovarian cysts.      Objective   Vital Signs:  /70   Pulse 92   Resp 18   Ht 165.1 cm (65\")   Wt 117 kg (258 lb 3.2 oz)   SpO2 99%   BMI 42.97 kg/m²   Estimated body mass index is 42.97 kg/m² as calculated from the following:    Height as of this encounter: 165.1 cm (65\").    Weight as of this encounter: 117 kg (258 lb 3.2 oz).            Physical Exam  Constitutional:       Appearance: Normal appearance.   HENT:      Head: Normocephalic and atraumatic.      Nose: Nose normal.      Mouth/Throat:      Mouth: Mucous membranes are moist.   Eyes:      General: Lids are normal.      Conjunctiva/sclera: Conjunctivae normal.   Cardiovascular:      Rate and Rhythm: Normal rate and regular rhythm.      Heart sounds: Normal heart sounds.   Pulmonary:      Effort: Pulmonary effort is normal.      Breath sounds: Normal breath sounds.   Musculoskeletal:      Cervical back: Normal range of motion.   Skin:     General: Skin is warm and dry.   Neurological:      General: No focal deficit present.      Mental Status: She is alert and oriented to person, place, and time.      Gait: Gait is intact.   Psychiatric:         Mood and Affect: Mood normal.         Behavior: Behavior normal.         Thought Content: Thought content normal.        Result Review :                Assessment and Plan   Diagnoses and all orders for this visit:    1. General medical exam (Primary)  Assessment & Plan:  Amenable to flu, Tdap, and HPV vaccination today.  She has not had her first Pap smear.  Will screen cholesterol.    Orders:  -     Hemoglobin A1c  -     Comprehensive metabolic panel  -     Lipid Panel  -     Hepatitis C Antibody  -     HPV Vaccine (HPV9)  -     Chlamydia trachomatis, Neisseria " gonorrhoeae, PCR - Urine, Urine, Clean Catch    2. Bilateral ovarian cysts  Assessment & Plan:  First ovarian cysts at 17. Experiencing mid-cycle severe pain and heavy menstrual bleeding. Stopped OTC progestin only OCP in August after a few months - it improved bleeding but she did experience irregularity. Has to change menstrual cup 4-5 times daily on heavy cycle days.     Also experiences hirsutism.    Check testosterone, obtain pelvic ultrasound.  History does seem consistent with polycystic ovarian syndrome.    Orders:  -     US Pelvis Transvaginal Non OB; Future  -     Testosterone, Free, Total    3. Class 3 severe obesity due to excess calories without serious comorbidity with body mass index (BMI) of 40.0 to 44.9 in adult  Assessment & Plan:  Patient's (Body mass index is 42.97 kg/m².) indicates that they are morbidly/severely obese (BMI > 40 or > 35 with obesity - related health condition) with health conditions that include none . Weight is unchanged. BMI  is above average; BMI management plan is completed. We discussed portion control, increasing exercise, pharmacologic options including Wegovy, and an lorene-based approach such as [employer] Pal or Lose It.     Patient is attentive to diet, recognizes excessive intake of fast food recently, and goes to the gym regularly.  Calorie counting has been effective in the past, but has worsened concomitant mood disorders.  History of binging in the past as well.    Main reason for wanting to control the weight is her chronic left ankle pain.    Parents are doing well with Wegovy.    Discussed risk and benefits of medical therapy.  Trial Wegovy.  If any insurance pushback, she will be amenable to working with a nutritionist on nonpharmacological weight loss strategies.    Orders:  -     Hemoglobin A1c  -     Comprehensive metabolic panel  -     Lipid Panel    4. Attention and concentration deficit  Assessment & Plan:  History of anxiety, discontinued Lexapro prior to  going to college.  Patient reports that she did not feel any different with that.  She is concerned for executive dysfunction versus anxiety.  Referral placed for neuropsych testing.    In the meantime, she is interested in trialing medication for management of symptoms.  Okay to trial Wellbutrin.    Orders:  -     NeuroPsych Testing; Future    5. Anxiety    Other orders  -     Fluzone >6mos ([id number])  -     Semaglutide-Weight Management (Wegovy) 0.25 MG/0.5ML solution auto-injector; Inject 0.5 mL under the skin into the appropriate area as directed 1 (One) Time Per Week.  Dispense: 2 mL; Refill: 0  -     Tdap Vaccine => 8yo IM (BOOSTRIX/ADACEL)  -     buPROPion XL (Wellbutrin XL) 150 MG 24 hr tablet; Take 1 tablet by mouth Daily for 4 days, THEN 2 tablets Daily for 58 days.  Dispense: 120 tablet; Refill: 0             Follow Up   Return in about 4 weeks (around 11/15/2024) for Recheck - PAP.  Patient was given instructions and counseling regarding her condition or for health maintenance advice. Please see specific information pulled into the AVS if appropriate.

## 2024-10-18 NOTE — ASSESSMENT & PLAN NOTE
Patient's (Body mass index is 42.97 kg/m².) indicates that they are morbidly/severely obese (BMI > 40 or > 35 with obesity - related health condition) with health conditions that include none . Weight is unchanged. BMI  is above average; BMI management plan is completed. We discussed portion control, increasing exercise, pharmacologic options including Wegovy, and an lorene-based approach such as [employer]ur[employer] Pal or Lose It.     Patient is attentive to diet, recognizes excessive intake of fast food recently, and goes to the gym regularly.  Calorie counting has been effective in the past, but has worsened concomitant mood disorders.  History of binging in the past as well.    Main reason for wanting to control the weight is her chronic left ankle pain.    Parents are doing well with Wegovy.    Discussed risk and benefits of medical therapy.  Trial Wegovy.  If any insurance pushback, she will be amenable to working with a nutritionist on nonpharmacological weight loss strategies.

## 2024-10-18 NOTE — ASSESSMENT & PLAN NOTE
Amenable to flu, Tdap, and HPV vaccination today.  She has not had her first Pap smear.  Will screen cholesterol.

## 2024-10-18 NOTE — ASSESSMENT & PLAN NOTE
History of anxiety, discontinued Lexapro prior to going to college.  Patient reports that she did not feel any different with that.  She is concerned for executive dysfunction versus anxiety.  Referral placed for neuropsych testing.    In the meantime, she is interested in trialing medication for management of symptoms.  Okay to trial Wellbutrin.

## 2024-10-19 LAB
ALBUMIN SERPL-MCNC: 4.2 G/DL (ref 3.5–5.2)
ALBUMIN/GLOB SERPL: 1.6 G/DL
ALP SERPL-CCNC: 105 U/L (ref 39–117)
ALT SERPL-CCNC: 12 U/L (ref 1–33)
AST SERPL-CCNC: 11 U/L (ref 1–32)
BILIRUB SERPL-MCNC: 0.5 MG/DL (ref 0–1.2)
BUN SERPL-MCNC: 9 MG/DL (ref 6–20)
BUN/CREAT SERPL: 12.2 (ref 7–25)
CALCIUM SERPL-MCNC: 9.5 MG/DL (ref 8.6–10.5)
CHLORIDE SERPL-SCNC: 106 MMOL/L (ref 98–107)
CHOLEST SERPL-MCNC: 97 MG/DL (ref 0–200)
CO2 SERPL-SCNC: 23.5 MMOL/L (ref 22–29)
CREAT SERPL-MCNC: 0.74 MG/DL (ref 0.57–1)
EGFRCR SERPLBLD CKD-EPI 2021: 118.2 ML/MIN/1.73
GLOBULIN SER CALC-MCNC: 2.7 GM/DL
GLUCOSE SERPL-MCNC: 81 MG/DL (ref 65–99)
HBA1C MFR BLD: 5.1 % (ref 4.8–5.6)
HCV IGG SERPL QL IA: NON REACTIVE
HDLC SERPL-MCNC: 43 MG/DL (ref 40–60)
LDLC SERPL CALC-MCNC: 38 MG/DL (ref 0–100)
POTASSIUM SERPL-SCNC: 4.2 MMOL/L (ref 3.5–5.2)
PROT SERPL-MCNC: 6.9 G/DL (ref 6–8.5)
SODIUM SERPL-SCNC: 140 MMOL/L (ref 136–145)
TESTOST FREE SERPL-MCNC: 2.8 PG/ML (ref 0–4.2)
TESTOST SERPL-MCNC: 53 NG/DL (ref 13–71)
TRIGL SERPL-MCNC: 78 MG/DL (ref 0–150)
VLDLC SERPL CALC-MCNC: 16 MG/DL (ref 5–40)

## 2024-10-21 LAB
C TRACH RRNA SPEC QL NAA+PROBE: NEGATIVE
N GONORRHOEA RRNA SPEC QL NAA+PROBE: NEGATIVE

## 2024-11-01 ENCOUNTER — HOSPITAL ENCOUNTER (OUTPATIENT)
Dept: ULTRASOUND IMAGING | Facility: HOSPITAL | Age: 24
Discharge: HOME | End: 2024-11-01
Admitting: FAMILY MEDICINE
Payer: COMMERCIAL

## 2024-11-01 DIAGNOSIS — N83.201 BILATERAL OVARIAN CYSTS: ICD-10-CM

## 2024-11-01 DIAGNOSIS — N83.202 BILATERAL OVARIAN CYSTS: ICD-10-CM

## 2024-11-01 PROCEDURE — 76830 TRANSVAGINAL US NON-OB: CPT

## 2024-11-01 PROCEDURE — 76856 US EXAM PELVIC COMPLETE: CPT

## 2024-11-22 ENCOUNTER — OFFICE VISIT (OUTPATIENT)
Dept: FAMILY MEDICINE CLINIC | Facility: CLINIC | Age: 24
End: 2024-11-22
Payer: COMMERCIAL

## 2024-11-22 VITALS
RESPIRATION RATE: 18 BRPM | DIASTOLIC BLOOD PRESSURE: 70 MMHG | SYSTOLIC BLOOD PRESSURE: 110 MMHG | WEIGHT: 248.2 LBS | HEIGHT: 65 IN | HEART RATE: 97 BPM | BODY MASS INDEX: 41.35 KG/M2 | OXYGEN SATURATION: 99 %

## 2024-11-22 DIAGNOSIS — N89.8 VAGINAL DISCHARGE: ICD-10-CM

## 2024-11-22 DIAGNOSIS — R41.840 ATTENTION AND CONCENTRATION DEFICIT: ICD-10-CM

## 2024-11-22 DIAGNOSIS — Z12.4 SCREENING FOR CERVICAL CANCER: Primary | ICD-10-CM

## 2024-11-22 RX ORDER — AZITHROMYCIN 250 MG/1
TABLET, FILM COATED ORAL
COMMUNITY
End: 2024-11-22

## 2024-11-22 RX ORDER — ONDANSETRON 4 MG/1
TABLET, FILM COATED ORAL
COMMUNITY
End: 2024-11-22

## 2024-11-22 RX ORDER — ESCITALOPRAM OXALATE 20 MG/1
1 TABLET ORAL DAILY
COMMUNITY
End: 2024-11-22

## 2024-11-22 RX ORDER — BUPROPION HYDROCHLORIDE 100 MG/1
100 TABLET, EXTENDED RELEASE ORAL 2 TIMES DAILY
Qty: 60 TABLET | Refills: 0 | Status: SHIPPED | OUTPATIENT
Start: 2024-11-22

## 2024-11-22 NOTE — PATIENT INSTRUCTIONS
Reach out to insurance company for Nexplanon coverage - then we can order it to the clinic and get your appointment set up.

## 2024-11-25 LAB
A VAGINAE DNA VAG QL NAA+PROBE: ABNORMAL SCORE
BVAB2 DNA VAG QL NAA+PROBE: ABNORMAL SCORE
C ALBICANS DNA VAG QL NAA+PROBE: POSITIVE
C GLABRATA DNA VAG QL NAA+PROBE: NEGATIVE
C KRUSEI DNA VAG QL NAA+PROBE: NEGATIVE
C LUSITANIAE DNA VAG QL NAA+PROBE: NEGATIVE
C TRACH DNA SPEC QL NAA+PROBE: NEGATIVE
CANDIDA DNA VAG QL NAA+PROBE: NEGATIVE
MEGA1 DNA VAG QL NAA+PROBE: ABNORMAL SCORE
N GONORRHOEA DNA VAG QL NAA+PROBE: NEGATIVE
T VAGINALIS DNA VAG QL NAA+PROBE: NEGATIVE

## 2024-11-27 RX ORDER — FLUCONAZOLE 150 MG/1
150 TABLET ORAL ONCE
Qty: 1 TABLET | Refills: 0 | Status: SHIPPED | OUTPATIENT
Start: 2024-11-27 | End: 2024-11-27

## 2024-11-30 PROBLEM — Z12.4 SCREENING FOR CERVICAL CANCER: Status: ACTIVE | Noted: 2024-11-30

## 2024-11-30 PROBLEM — N89.8 VAGINAL DISCHARGE: Status: ACTIVE | Noted: 2024-11-30

## 2024-12-01 PROBLEM — R41.840 ATTENTION AND CONCENTRATION DEFICIT: Chronic | Status: ACTIVE | Noted: 2024-10-18

## 2024-12-01 NOTE — ASSESSMENT & PLAN NOTE
Noted benefit to sx with welbutrin but noted dizziness that was intolerable. Trial lower dose SR vs XL.

## 2024-12-01 NOTE — PROGRESS NOTES
"Answers submitted by the patient for this visit:  Other (Submitted on 11/15/2024)  Please describe your symptoms.: Follow up, dizziness  Have you had these symptoms before?: Yes  How long have you been having these symptoms?: Greater than 2 weeks  Please describe any probable cause for these symptoms. : New medication  Primary Reason for Visit (Submitted on 11/15/2024)  What is the primary reason for your visit?: Problem Not Listed  Chief Complaint  PAP    Subjective        Kyung Collazo presents to [facility]  History of Present Illness  25 yo f presents to clinic to f/u on welbutrin and get pap.    Welbutrin has positive effect on mood and executive functioning, but also causes intolerable side effect - dizziness.      Objective   Vital Signs:  /70   Pulse 97   Resp 18   Ht 165.1 cm (65\")   Wt 113 kg (248 lb 3.2 oz)   SpO2 99%   BMI 41.30 kg/m²   Estimated body mass index is 41.3 kg/m² as calculated from the following:    Height as of this encounter: 165.1 cm (65\").    Weight as of this encounter: 113 kg (248 lb 3.2 oz).            Physical Exam  Constitutional:       Appearance: Normal appearance.   HENT:      Head: Normocephalic and atraumatic.      Nose: Nose normal.      Mouth/Throat:      Mouth: Mucous membranes are moist.   Eyes:      General: Lids are normal.      Conjunctiva/sclera: Conjunctivae normal.   Pulmonary:      Effort: Pulmonary effort is normal.   Genitourinary:     General: Normal vulva.      Vagina: Vaginal discharge present. No lesions.      Cervix: Normal.   Musculoskeletal:      Cervical back: Normal range of motion.   Skin:     General: Skin is warm and dry.   Neurological:      General: No focal deficit present.      Mental Status: She is alert and oriented to person, place, and time.      Gait: Gait is intact.   Psychiatric:         Mood and Affect: Mood normal.         Behavior: Behavior normal.         Thought Content: Thought content " normal.        Result Review :                Assessment and Plan   Diagnoses and all orders for this visit:    1. Screening for cervical cancer (Primary)  Assessment & Plan:  Pap obtained.    Orders:  -     IGP, CtNg, Rfx Aptima HPV ASCU    2. Vaginal discharge  Assessment & Plan:  Noted on exam, pt asymptomatic.    Orders:  -     NuSwab VG+, Candida 6sp    3. Attention and concentration deficit  Assessment & Plan:  Noted benefit to sx with welbutrin but noted nausea with dosing. Trial lower dose SR vs XL.       Other orders  -     buPROPion SR (Wellbutrin SR) 100 MG 12 hr tablet; Take 1 tablet by mouth 2 (Two) Times a Day.  Dispense: 60 tablet; Refill: 0  -     fluconazole (DIFLUCAN) 150 MG tablet; Take 1 tablet by mouth 1 (One) Time for 1 dose.  Dispense: 1 tablet; Refill: 0             Follow Up   No follow-ups on file.  Patient was given instructions and counseling regarding her condition or for health maintenance advice. Please see specific information pulled into the AVS if appropriate.

## 2024-12-02 LAB
C TRACH RRNA CVX QL NAA+PROBE: NEGATIVE
CONV .: NORMAL
CYTOLOGIST CVX/VAG CYTO: NORMAL
CYTOLOGY CVX/VAG DOC CYTO: NORMAL
CYTOLOGY CVX/VAG DOC THIN PREP: NORMAL
DX ICD CODE: NORMAL
Lab: NORMAL
N GONORRHOEA RRNA CVX QL NAA+PROBE: NEGATIVE
OTHER STN SPEC: NORMAL
STAT OF ADQ CVX/VAG CYTO-IMP: NORMAL

## 2024-12-23 RX ORDER — BUPROPION HYDROCHLORIDE 100 MG/1
100 TABLET, EXTENDED RELEASE ORAL 2 TIMES DAILY
Qty: 60 TABLET | Refills: 0 | Status: SHIPPED | OUTPATIENT
Start: 2024-12-23

## 2025-01-09 ENCOUNTER — TELEPHONE (OUTPATIENT)
Dept: FAMILY MEDICINE CLINIC | Facility: CLINIC | Age: 25
End: 2025-01-09
Payer: COMMERCIAL

## 2025-01-09 NOTE — TELEPHONE ENCOUNTER
Caller: Kyung Collazo    Relationship: Self    Best call back number:     What is the best time to reach you:     Who are you requesting to speak with (clinical staff, provider,  specific staff member):     Do you know the name of the person who called:     What was the call regarding:  PATIENT WANTS TO BE CALLED AND DICUSS A BIRTH CONTROL METHOD (NEXPLANON)BEING DEEMED AS PREVENTATIVE SO THAT HER INSURANCE WILL COVER  PERCENT.

## 2025-01-10 NOTE — TELEPHONE ENCOUNTER
Will be sending out a pharmacy form to see if the nexplanon is covered under the pharmacy [facility]. Will call patient and let her know.

## 2025-01-24 ENCOUNTER — PROCEDURE VISIT (OUTPATIENT)
Dept: FAMILY MEDICINE CLINIC | Facility: CLINIC | Age: 25
End: 2025-01-24
Payer: COMMERCIAL

## 2025-01-24 VITALS
SYSTOLIC BLOOD PRESSURE: 122 MMHG | HEIGHT: 65 IN | BODY MASS INDEX: 41.84 KG/M2 | RESPIRATION RATE: 18 BRPM | DIASTOLIC BLOOD PRESSURE: 82 MMHG | OXYGEN SATURATION: 97 % | HEART RATE: 83 BPM | WEIGHT: 251.1 LBS

## 2025-01-24 DIAGNOSIS — Z30.017 NEXPLANON INSERTION: Primary | ICD-10-CM

## 2025-01-24 PROCEDURE — 11981 INSERTION DRUG DLVR IMPLANT: CPT | Performed by: FAMILY MEDICINE

## 2025-01-24 PROCEDURE — 81025 URINE PREGNANCY TEST: CPT | Performed by: FAMILY MEDICINE

## 2025-01-24 NOTE — PROGRESS NOTES
Subdermal Contraceptive Implant Insertion Note    Kyung Collazo desires a subdermal etonogestrel contraceptive implant insertion.  She has been counseled regarding the risks, benefits and alternatives to the implant.  She especially understands that her menstrual periods are expected to become irregular and unpredictable throughout the time she is using the implant.  She has no contraindications to the insertion.  Her questions have been answered.  She has fully reviewed the FDA-approved consent brochure, has signed the consent form, and wishes to proceed with the insertion today.     Current method of contraception:  no method    Patient's last menstrual period was 12/30/2024 (exact date).    Urine pregnancy test: Negative    Procedure Time Out Documentation       Procedure Details  The inner side of the left arm was cleaned with Betadinex3 and infiltrated with 1% lidocaine with epinephrine.  The contraceptive leonela was inserted according to the 's instructions without complications.  The leonela was palpable under the skin after the insertion.  The insertion site was closed with Steri-strip and a pressure dressing was applied.    Lot:  [id number]  Exp:  2027-0   [id number]    Kyung was given post-insertion instructions.  She understands that the implant must be removed at the end of three years and may be removed sooner if she wishes.    Successful insertion of implanon device.    Patient tolerated the procedure well without complications.

## 2025-01-24 NOTE — PATIENT INSTRUCTIONS
The implant starts working in 7 days to prevent pregnancy.   You should use a back-up method for the first 7 days after implant placement  unless your period started less than 5 days ago. If your period started less than 5   days ago, the implant starts working right away, and you do not need a backup  method.    The implant can remain under your skin for UP TO 5 years - FDA approval for 3 years.       Things to know:    Common side effects include: Irregular bleeding. Your periods may change. You   may have more bleeding, less bleeding, or no bleeding, and periods may last   longer than usual.   Bruising and swelling at site are common in the first 24 hours. Keep the dressing   on for 24 hours. After 24 hours, you can remove the dressing and take a shower   or bath.    You can check the implant by pressing your fingertips over the skin where the   implant was inserted. You should feel a small leonela. If you do not feel your   implant, call your clinician.   You may return to school or work after your visit.   The implant does NOT protect against sexually transmitted infections (STIs). You should   use latex condoms and/or dental dams to prevent STIs. Most people should get tested   for STIs once a year.   Warning Signs:   Within First Week   Redness, warmth, or drainage from   insertion site   Fever (>101 degrees)        At Any Time   Feeling pregnant (breast pain,   nausea)   Positive home pregnancy test

## 2025-03-14 ENCOUNTER — OFFICE VISIT (OUTPATIENT)
Dept: FAMILY MEDICINE CLINIC | Facility: CLINIC | Age: 25
End: 2025-03-14
Payer: COMMERCIAL

## 2025-03-14 VITALS
HEART RATE: 85 BPM | BODY MASS INDEX: 42.24 KG/M2 | SYSTOLIC BLOOD PRESSURE: 120 MMHG | DIASTOLIC BLOOD PRESSURE: 82 MMHG | RESPIRATION RATE: 18 BRPM | WEIGHT: 253.5 LBS | OXYGEN SATURATION: 98 % | HEIGHT: 65 IN

## 2025-03-14 DIAGNOSIS — R41.840 ATTENTION AND CONCENTRATION DEFICIT: Primary | Chronic | ICD-10-CM

## 2025-03-14 DIAGNOSIS — F41.9 ANXIETY: ICD-10-CM

## 2025-03-14 PROCEDURE — 99214 OFFICE O/P EST MOD 30 MIN: CPT | Performed by: FAMILY MEDICINE
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
## 2025-04-11 ENCOUNTER — OFFICE VISIT (OUTPATIENT)
Dept: FAMILY MEDICINE CLINIC | Facility: CLINIC | Age: 25
End: 2025-04-11
Payer: COMMERCIAL

## 2025-04-11 VITALS
DIASTOLIC BLOOD PRESSURE: 84 MMHG | OXYGEN SATURATION: 100 % | HEART RATE: 91 BPM | SYSTOLIC BLOOD PRESSURE: 124 MMHG | BODY MASS INDEX: 43.22 KG/M2 | HEIGHT: 65 IN | WEIGHT: 259.4 LBS

## 2025-04-11 DIAGNOSIS — R41.840 ATTENTION AND CONCENTRATION DEFICIT: Chronic | ICD-10-CM

## 2025-04-11 DIAGNOSIS — R00.2 PALPITATIONS: Primary | ICD-10-CM

## 2025-04-11 DIAGNOSIS — F41.9 ANXIETY: ICD-10-CM

## 2025-04-11 RX ORDER — HYDROXYZINE HYDROCHLORIDE 25 MG/1
50 TABLET, FILM COATED ORAL NIGHTLY PRN
Qty: 60 TABLET | Refills: 0 | Status: SHIPPED | OUTPATIENT
Start: 2025-04-11

## 2025-04-11 RX ORDER — SERTRALINE HYDROCHLORIDE 100 MG/1
100 TABLET, FILM COATED ORAL DAILY
Qty: 90 TABLET | Refills: 0 | Status: SHIPPED | OUTPATIENT
Start: 2025-04-11

## 2025-04-11 NOTE — PROGRESS NOTES
"Chief Complaint  Anxiety    Subjective        Kyung Collazo presents to [facility]       The patient is a 24-year-old female who presents to the clinic for follow-up on executive function deficit.    She reports a significant improvement in her condition, attributing it to the medication's positive impact on her mood stability and depression. She acknowledges the importance of therapy in her treatment plan and expresses optimism about her progress. However, she continues to struggle with racing thoughts, particularly at bedtime, which has been a longstanding issue. She also reports persistent executive dysfunction, although it has improved. She finds that managing her anxiety has made her condition more manageable. She has no history of seizures or purging behaviors. She has previously taken Lexapro from ages 14 to 20 and experienced dizziness and irritability with bupropion. She is open to increasing her Zoloft dosage to 100 mg. She tolerates Benadryl well, with drowsiness being the only side effect.    She has noticed symptoms of palpitations in the past, but it is hard to tell with anxiety. It was never a huge concern before, but now she is concerned because of her family history.    FAMILY HISTORY  Her maternal aunt has atrial fibrillation and heart valve disease. Her brother is experiencing issues with an abnormal atrial rhythm and is awaiting Holter monitor results. Her mother has anxiety and high stress levels. She is not aware of her father's side of the family, but they might have heart issues and are heavy smokers.    MEDICATIONS  Current: Zoloft  Past: Lexapro, bupropion    Anxiety        Objective   Vital Signs:  /84   Pulse 91   Ht 165.1 cm (65\")   Wt 118 kg (259 lb 6.4 oz)   SpO2 100%   BMI 43.17 kg/m²   Estimated body mass index is 43.17 kg/m² as calculated from the following:    Height as of this encounter: 165.1 cm (65\").    Weight as of this encounter: " 118 kg (259 lb 6.4 oz).            Physical Exam  Constitutional:       Appearance: Normal appearance.   HENT:      Head: Normocephalic and atraumatic.      Nose: Nose normal.      Mouth/Throat:      Mouth: Mucous membranes are moist.   Eyes:      General: Lids are normal.      Conjunctiva/sclera: Conjunctivae normal.   Pulmonary:      Effort: Pulmonary effort is normal.   Musculoskeletal:      Cervical back: Normal range of motion.   Skin:     General: Skin is warm and dry.   Neurological:      General: No focal deficit present.      Mental Status: She is alert and oriented to person, place, and time.      Gait: Gait is intact.   Psychiatric:         Mood and Affect: Mood normal.         Behavior: Behavior normal.         Thought Content: Thought content normal.        Result Review :               Results         Assessment and Plan        1. Executive function deficit.  She reports a significant improvement in mood stability and depression with the current medication regimen. However, she continues to experience racing thoughts and sleep disturbances. Increasing the dose of Zoloft to 100 mg is recommended. Hydroxyzine will be prescribed for use during episodes of severe anxiety and racing thoughts, with a dosage of 1 to 2 tablets at night. If there is no substantial improvement in her symptoms with the increased Zoloft dosage, the addition of a low-dose long-acting stimulant will be considered in 4 to 6 weeks.    2. Anxiety.  She reports that controlling anxiety has helped manage her executive dysfunction better. Hydroxyzine will be prescribed for use during episodes of severe anxiety and racing thoughts, with a dosage of 1 to 2 tablets at night.    3. Depression.  She reports a significant improvement in depressive symptoms with the current medication. Increasing the dose of Zoloft to 100 mg is recommended to further enhance mood stability.    4. Sleep disturbances.  She continues to experience racing thoughts  and difficulty sleeping. Hydroxyzine will be prescribed for use during episodes of severe anxiety and racing thoughts, with a dosage of 1 to 2 tablets at night.    5. Family history of heart issues.  Given her family history of heart issues, including her maternal aunt's atrial fibrillation and heart valve disease, and her brother's abnormal atrial rhythm, a baseline EKG will be conducted today. A referral to a cardiologist will also be made. If she experiences any increase in symptoms, a consultation with a cardiologist will be arranged.    EKG normal sinus rhythm. No prior available for comparison.    Diagnoses and all orders for this visit:    1. Palpitations (Primary)  -     ECG 12 Lead  -     Ambulatory Referral to Cardiology    Other orders  -     sertraline (Zoloft) 100 MG tablet; Take 1 tablet by mouth Daily.  Dispense: 90 tablet; Refill: 0  -     hydrOXYzine (ATARAX) 25 MG tablet; Take 2 tablets by mouth At Night As Needed for Anxiety.  Dispense: 60 tablet; Refill: 0             Follow Up   Return in about 4 weeks (around 5/9/2025).  Patient was given instructions and counseling regarding her condition or for health maintenance advice. Please see specific information pulled into the AVS if appropriate.     Patient or patient representative verbalized consent for the use of Ambient Listening during the visit with  Vicki Villarreal MD for chart documentation. 4/11/2025  17:31 EDT

## 2025-05-02 ENCOUNTER — OFFICE VISIT (OUTPATIENT)
Dept: CARDIOLOGY | Age: 25
End: 2025-05-02
Payer: COMMERCIAL

## 2025-05-02 VITALS
OXYGEN SATURATION: 100 % | BODY MASS INDEX: 43.75 KG/M2 | DIASTOLIC BLOOD PRESSURE: 82 MMHG | HEIGHT: 65 IN | SYSTOLIC BLOOD PRESSURE: 122 MMHG | HEART RATE: 91 BPM | WEIGHT: 262.6 LBS

## 2025-05-02 DIAGNOSIS — R00.2 PALPITATIONS: Primary | ICD-10-CM

## 2025-05-02 DIAGNOSIS — F41.9 ANXIETY: ICD-10-CM

## 2025-05-02 DIAGNOSIS — Z82.49 FAMILY HISTORY OF CARDIAC ARRHYTHMIA: ICD-10-CM

## 2025-05-02 DIAGNOSIS — R41.840 ATTENTION AND CONCENTRATION DEFICIT: Chronic | ICD-10-CM

## 2025-05-02 DIAGNOSIS — R42 LIGHTHEADED: ICD-10-CM

## 2025-05-02 DIAGNOSIS — R06.09 DYSPNEA ON EXERTION: ICD-10-CM

## 2025-05-02 PROCEDURE — 99204 OFFICE O/P NEW MOD 45 MIN: CPT | Performed by: STUDENT IN AN ORGANIZED HEALTH CARE EDUCATION/TRAINING PROGRAM

## 2025-05-02 PROCEDURE — 93000 ELECTROCARDIOGRAM COMPLETE: CPT | Performed by: STUDENT IN AN ORGANIZED HEALTH CARE EDUCATION/TRAINING PROGRAM

## 2025-05-02 NOTE — PROGRESS NOTES
"      [facility]    Subjective:     Encounter Date:05/02/25      Patient ID: Kyung Collazo is a 24 y.o. female.    Chief Complaint:   Chief Complaint   Patient presents with    Establish Care     Patient is in the office today to establish care for Palpitations.       History of Present Illness    Kyung Collazo is a 24 y.o. lady presents for further evaluation of intermittent palpitations and dyspnea that occurred in the setting of anxiety.    She has a past medical history of anxiety, recent diagnosis of ADHD, and has had intermittent palpitation episodes.  The reason she presents to cardiology is that she has a family history of arrhythmia.  Her aunt has had significant complications due to A-fib, she reports that her brother has \"an atrial arrhythmia\" but she does not know the further details.  She knows her brother has had a required different cardiac test.    She reports intermittent palpitations that occur in the setting of breathlessness.  Sometimes occurs when she is anxious, other times not.  She reports a prominent heartbeat sensation and some lightheadedness that occurs when it happens.  She has not fully passed out but the lightheadedness has her concerned.    She presents today for cardiac evaluation prior to starting ADHD therapy as well    Previous Cardiac Testing:  None    The following portions of the patient's history were reviewed and updated as appropriate: allergies, current medications, past family history, past medical history, past social history, past surgical history and problem list.    Past Medical History:   Diagnosis Date    ADHD (attention deficit hyperactivity disorder)     Allergic     Allergic rhinitis     Ankle fracture 12/2020    Anxiety and depression     Arthritis     Asthma     PT DENIES DIAG.. STATED IT WAS ALLERGIES    Borderline anemia     Headache     History of medical problems     Obesity     Presence of retained hardware     Urinary tract " "infection        Past Surgical History:   Procedure Laterality Date    ANKLE ARTHROSCOPY Left 06/18/2021    Procedure: ANKLE ARTHROSCOPY/DEBRIDMENT REVISION DELTOID RECONSTRUCTION WITH ALLOGRAFT;  Surgeon: Joni Cho Jr., MD;  Location: [facility];  Service: Orthopedics;  Laterality: Left;    ANKLE FUSION Left 05/16/2022    Procedure: LEFT ANKLE ARTHRODESIS, CALCANEL BONE GRAFT;  Surgeon: Joni Cho Jr., MD;  Location: [facility];  Service: Orthopedics;  Laterality: Left;    ANKLE OPEN REDUCTION INTERNAL FIXATION Left 06/18/2021    Procedure: LEFT REMOVAL OF HARDWARE ANKLE REVISION OPEN REDUCTION INTERNAL FIXATION SYNDESMOSIS;  Surgeon: Joni Cho Jr., MD;  Location: [facility];  Service: Orthopedics;  Laterality: Left;    ANKLE OPEN REDUCTION INTERNAL FIXATION Left 11/22/2021    Procedure: LEFT REMOVAL OF SYNDESMOSIS SCREW REVISION OPEN REDUCTION INTERNAL FIXATION SYNDESMOSIS (30MIN);  Surgeon: Joni Cho Jr., MD;  Location: [facility];  Service: Orthopedics;  Laterality: Left;    APPENDECTOMY      BLADDER SURGERY      REFLUX. RESOLVED    BLADDER SURGERY      FRACTURE SURGERY      ORIF ANKLE FRACTURE  12/31/2020    [facility] [place]     URETERAL REIMPLANTATION             ECG 12 Lead    Date/Time: 5/2/2025 12:13 PM  Performed by: Rafy Chacon MD    Authorized by: Rafy Chacon MD  Comparison: compared with previous ECG from 4/11/2025  Similar to previous ECG  Rhythm: sinus rhythm  Rate: normal  Conduction: conduction normal  ST Segments: ST segments normal  T Waves: T waves normal  QRS axis: normal  Other: no other findings    Clinical impression: normal ECG             Objective:     Vitals:    05/02/25 1125   BP: 122/82   BP Location: Left arm   Patient Position: Sitting   Cuff Size: Adult   Pulse: 91   SpO2: 100%   Weight: 119 kg (262 lb 9.6 oz)   Height: 165.1 cm (65\")         Constitutional:       Appearance: Healthy appearance. Not in distress.   Neck:      Vascular: JVD " normal.   Pulmonary:      Effort: Pulmonary effort is normal.      Breath sounds: Normal breath sounds.   Cardiovascular:      PMI at left midclavicular line. Normal rate. Regular rhythm. Normal S2.       Murmurs: There is no murmur.   Pulses:     Intact distal pulses.   Edema:     Peripheral edema absent.   Skin:     General: Skin is warm and dry.   Neurological:      General: No focal deficit present.      Mental Status: Alert, oriented to person, place, and time and oriented to person, place and time.   Psychiatric:         Mood and Affect: Mood and affect normal.         Lab Review:     Lipid Panel          10/18/2024    15:07   Lipid Panel   Total Cholesterol 97    Triglycerides 78    HDL Cholesterol 43    VLDL Cholesterol 16    LDL Cholesterol  38      BUN   Date Value Ref Range Status   10/18/2024 9 6 - 20 mg/dL Final   12/28/2020 11 6 - 20 mg/dL Final   11/29/2019 11 7 - 20 mg/dL Final     Creatinine   Date Value Ref Range Status   10/18/2024 0.74 0.57 - 1.00 mg/dL Final   12/28/2020 0.64 0.57 - 1.00 mg/dL Final   11/29/2019 0.7 0.7 - 1.5 mg/dL Final     Potassium   Date Value Ref Range Status   10/18/2024 4.2 3.5 - 5.2 mmol/L Final   12/28/2020 4.3 3.5 - 5.2 mmol/L Final     Comment:     Slight hemolysis detected by analyzer. Results may be affected.   11/29/2019 4.5 3.5 - 5.1 mmol/L Final     ALT (SGPT)   Date Value Ref Range Status   10/18/2024 12 1 - 33 U/L Final   12/28/2020 14 1 - 33 U/L Final   03/14/2019 19 13 - 69 U/L Final     Comment:     Trace Hemolysis, test may be affected.     AST (SGOT)   Date Value Ref Range Status   10/18/2024 11 1 - 32 U/L Final   12/28/2020 18 1 - 32 U/L Final   03/14/2019 29 15 - 46 U/L Final     Comment:     Trace Hemolysis, test may be affected.         Performed        Assessment:          Diagnosis Plan   1. Palpitations  Adult Transthoracic Echo Complete w/ Color, Spectral and Contrast if Necessary Per Protocol    Holter Monitor - 72 Hour Up To 15 Days      2.  Anxiety        3. Attention and concentration deficit        4. Family history of cardiac arrhythmia  Adult Transthoracic Echo Complete w/ Color, Spectral and Contrast if Necessary Per Protocol    Holter Monitor - 72 Hour Up To 15 Days      5. Dyspnea on exertion  Adult Transthoracic Echo Complete w/ Color, Spectral and Contrast if Necessary Per Protocol    Holter Monitor - 72 Hour Up To 15 Days      6. Lightheaded  Adult Transthoracic Echo Complete w/ Color, Spectral and Contrast if Necessary Per Protocol    Holter Monitor - 72 Hour Up To 15 Days             Plan:         Palpitations: Accompanied with intermittent lightheadedness, prominent heartbeat sensation.  Unclear if this is a organic cause or related to anxiety.  She has a family history of a brother with an arrhythmia so I think this warrants additional testing  Check 1 week ZIO  Arranged for echo given the concomitant dyspnea and lightheadedness that occurs with her palpitation episodes.  ADHD: She is being considered for stimulant therapy.  Will await the echo and Holter monitor results.  I am sure that her symptoms are likely related to anxiety but as long as there is no organic cause I do not see any cardiovascular contraindications to stimulant therapy if needed  Anxiety: Per PCP    Thank you for allowing me to participate in the care of Kyung Collazo. Feel free to contact me directly with any further questions or concerns.    RTC as needed after echo and Holter    Rafy Chacon MD  [facility]  05/02/25  11:38 EDT       Current Outpatient Medications:     Etonogestrel (Nexplanon) 68 MG implant subdermal implant, To be inserted one time by prescriber. Route Subdermal., Disp: 1 each, Rfl: 0    hydrOXYzine (ATARAX) 25 MG tablet, Take 2 tablets by mouth At Night As Needed for Anxiety., Disp: 60 tablet, Rfl: 0    sertraline (Zoloft) 100 MG tablet, Take 1 tablet by mouth Daily., Disp: 90 tablet, Rfl: 0    Semaglutide-Weight Management  (Wegovy) 0.25 MG/0.5ML solution auto-injector, Inject 0.5 mL under the skin into the appropriate area as directed 1 (One) Time Per Week. (Patient not taking: Reported on 3/14/2025), Disp: 2 mL, Rfl: 0         Return if symptoms worsen or fail to improve.      Part of this note may be an electronic transcription/translation of spoken language to printed text using the Dragon Dictation System.

## 2025-05-09 ENCOUNTER — OFFICE VISIT (OUTPATIENT)
Dept: FAMILY MEDICINE CLINIC | Facility: CLINIC | Age: 25
End: 2025-05-09
Payer: COMMERCIAL

## 2025-05-09 VITALS
HEART RATE: 102 BPM | BODY MASS INDEX: 44.98 KG/M2 | SYSTOLIC BLOOD PRESSURE: 107 MMHG | WEIGHT: 270 LBS | OXYGEN SATURATION: 97 % | HEIGHT: 65 IN | DIASTOLIC BLOOD PRESSURE: 70 MMHG

## 2025-05-09 DIAGNOSIS — F41.9 ANXIETY: Primary | ICD-10-CM

## 2025-05-09 DIAGNOSIS — R41.840 ATTENTION AND CONCENTRATION DEFICIT: Chronic | ICD-10-CM

## 2025-05-09 DIAGNOSIS — Z79.899 CONTROLLED SUBSTANCE AGREEMENT SIGNED: ICD-10-CM

## 2025-05-09 DIAGNOSIS — R00.2 PALPITATIONS: ICD-10-CM

## 2025-05-09 PROCEDURE — 99214 OFFICE O/P EST MOD 30 MIN: CPT | Performed by: FAMILY MEDICINE

## 2025-05-09 RX ORDER — SERTRALINE HYDROCHLORIDE 100 MG/1
100 TABLET, FILM COATED ORAL DAILY
Qty: 90 TABLET | Refills: 1 | Status: SHIPPED | OUTPATIENT
Start: 2025-05-09

## 2025-05-09 NOTE — PROGRESS NOTES
"Chief Complaint  Anxiety    Subjective        Kyung Collazo presents to [facility]       The patient is a 24-year-old female who presents to the clinic for follow-up on executive function deficit, anxiety, and a recent visit with a cardiologist.    She reports that the increased dosage of Zoloft has been beneficial in managing her symptoms. 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 She continues to experience some anxiety related to these external factors but notes an improvement in her concentration. However, she acknowledges persistent motivation issues, which she attributes to the reduction in anxiety. She has found hydroxyzine to be particularly helpful in improving her sleep quality, using it more frequently than not. She has discontinued the use of Wegovy, citing it as a low priority given her current focus on managing her anxiety and depression.    She is currently wearing a Holter monitor and plans to schedule an echocardiogram today.    MEDICATIONS  Current: Zoloft, hydroxyzine    Anxiety      Objective   Vital Signs:  /70   Pulse 102   Ht 165.1 cm (65\")   Wt 122 kg (270 lb)   SpO2 97%   BMI 44.93 kg/m²   Estimated body mass index is 44.93 kg/m² as calculated from the following:    Height as of this encounter: 165.1 cm (65\").    Weight as of this encounter: 122 kg (270 lb).            Physical Exam  Constitutional:       Appearance: Normal appearance.   HENT:      Head: Normocephalic and atraumatic.      Nose: Nose normal.      Mouth/Throat:      Mouth: Mucous membranes are moist.   Eyes:      General: Lids are normal.      Conjunctiva/sclera: Conjunctivae normal.   Pulmonary:      Effort: Pulmonary effort is normal.   Musculoskeletal:      Cervical back: Normal range of motion.   Skin:     " General: Skin is warm and dry.   Neurological:      General: No focal deficit present.      Mental Status: She is alert and oriented to person, place, and time.      Gait: Gait is intact.   Psychiatric:         Mood and Affect: Mood normal.         Behavior: Behavior normal.         Thought Content: Thought content normal.        Result Review :               Results         Assessment and Plan        1. Executive function deficit.  The patient will be initiated on Concerta pending the results of her echocardiogram and Holter study. A controlled substance agreement has been established, and she has consented to urine drug testing today and annually thereafter. The controlled substance agreement will be renewed on an annual basis. She will continue to utilize the same pharmacy, with the understanding that due to potential drug shortages, it may be necessary to switch. She understands that medications will not be refilled early in the event they are lost, with early refills only being granted if one of us is traveling or out of town. She will disclose her status on a controlled substance agreement to any other providers that may prescribe controlled substances. She will not allow family members to try her medications.    2. Anxiety.  The dosage of Zoloft will be increased to 150 mg daily. She will continue with hydroxyzine as needed for sleep. Prescriptions for Zoloft and hydroxyzine have been sent to the [facility] order pharmacy. If she notices improvement with the increased dose of Zoloft, she will continue with it; if not, she can revert to the previous dose of 100 mg.    3. Depression.  The dosage of Zoloft will be increased to 150 mg daily. She will continue with hydroxyzine as needed for sleep. Prescriptions for Zoloft and hydroxyzine have been sent to the [facility] order pharmacy. If she notices improvement with the increased dose of Zoloft, she will continue with it; if not, she can revert to the  previous dose of 100 mg.    4. Cardiovascular evaluation.  She is currently wearing a Holter monitor and plans to schedule an echocardiogram today. The results of these tests will be reviewed to ensure there are no organic abnormalities of the heart before starting Concerta.    Diagnoses and all orders for this visit:    1. Anxiety (Primary)    2. Attention and concentration deficit  -     Urine Drug Screen - Urine, Clean Catch    3. Palpitations    4. Controlled substance agreement signed  -     Urine Drug Screen - Urine, Clean Catch    Other orders  -     sertraline (Zoloft) 50 MG tablet; Take 1 tablet by mouth Daily. With one 100mg tab for total of 150mg daily.  Dispense: 90 tablet; Refill: 1  -     sertraline (Zoloft) 100 MG tablet; Take 1 tablet by mouth Daily. With one 50mg tab for a total of 150mg daily.  Dispense: 90 tablet; Refill: 1             Follow Up   No follow-ups on file.  Patient was given instructions and counseling regarding her condition or for health maintenance advice. Please see specific information pulled into the AVS if appropriate.     Patient or patient representative verbalized consent for the use of Ambient Listening during the visit with  Vicki Villarreal MD for chart documentation. 5/9/2025  13:25 EDT

## 2025-05-10 LAB
AMPHETAMINES UR QL SCN: NEGATIVE NG/ML
BARBITURATES UR QL SCN: NEGATIVE NG/ML
BENZODIAZ UR QL SCN: NEGATIVE NG/ML
BZE UR QL SCN: NEGATIVE NG/ML
CANNABINOIDS UR QL SCN: NEGATIVE NG/ML
CREAT UR-MCNC: 41.2 MG/DL (ref 20–300)
LABORATORY COMMENT REPORT: NORMAL
METHADONE UR QL SCN: NEGATIVE NG/ML
OPIATES UR QL SCN: NEGATIVE NG/ML
OXYCODONE+OXYMORPHONE UR QL SCN: NEGATIVE NG/ML
PCP UR QL: NEGATIVE NG/ML
PH UR: 6.9 [PH] (ref 4.5–8.9)
PROPOXYPH UR QL SCN: NEGATIVE NG/ML

## 2025-05-15 ENCOUNTER — TELEPHONE (OUTPATIENT)
Dept: CARDIOLOGY | Age: 25
End: 2025-05-15
Payer: COMMERCIAL

## 2025-05-15 ENCOUNTER — TELEPHONE (OUTPATIENT)
Dept: FAMILY MEDICINE CLINIC | Facility: CLINIC | Age: 25
End: 2025-05-15
Payer: COMMERCIAL

## 2025-05-16 ENCOUNTER — HOSPITAL ENCOUNTER (OUTPATIENT)
Dept: CARDIOLOGY | Facility: HOSPITAL | Age: 25
Discharge: HOME | End: 2025-05-16
Admitting: STUDENT IN AN ORGANIZED HEALTH CARE EDUCATION/TRAINING PROGRAM
Payer: COMMERCIAL

## 2025-05-16 VITALS
SYSTOLIC BLOOD PRESSURE: 140 MMHG | HEIGHT: 65 IN | HEART RATE: 75 BPM | DIASTOLIC BLOOD PRESSURE: 80 MMHG | BODY MASS INDEX: 44.98 KG/M2 | WEIGHT: 270 LBS

## 2025-05-16 DIAGNOSIS — Z82.49 FAMILY HISTORY OF CARDIAC ARRHYTHMIA: ICD-10-CM

## 2025-05-16 DIAGNOSIS — R06.09 DYSPNEA ON EXERTION: ICD-10-CM

## 2025-05-16 DIAGNOSIS — R42 LIGHTHEADED: ICD-10-CM

## 2025-05-16 DIAGNOSIS — R00.2 PALPITATIONS: ICD-10-CM

## 2025-05-16 LAB
AORTIC ARCH: 2.5 CM
AORTIC DIMENSIONLESS INDEX: 0.77 (DI)
ASCENDING AORTA: 2.8 CM
AV MEAN PRESS GRAD SYS DOP V1V2: 4 MMHG
AV VMAX SYS DOP: 130 CM/SEC
BH CV ECHO MEAS - ACS: 1.88 CM
BH CV ECHO MEAS - AO MAX PG: 6.8 MMHG
BH CV ECHO MEAS - AO ROOT DIAM: 2.5 CM
BH CV ECHO MEAS - AO V2 VTI: 24.4 CM
BH CV ECHO MEAS - AVA(I,D): 2.38 CM2
BH CV ECHO MEAS - EDV(CUBED): 97.3 ML
BH CV ECHO MEAS - EDV(MOD-SP2): 110 ML
BH CV ECHO MEAS - EDV(MOD-SP4): 106 ML
BH CV ECHO MEAS - EF(MOD-SP2): 60 %
BH CV ECHO MEAS - EF(MOD-SP4): 64.2 %
BH CV ECHO MEAS - ESV(CUBED): 22.5 ML
BH CV ECHO MEAS - ESV(MOD-SP2): 44 ML
BH CV ECHO MEAS - ESV(MOD-SP4): 38 ML
BH CV ECHO MEAS - FS: 38.6 %
BH CV ECHO MEAS - IVS/LVPW: 0.88 CM
BH CV ECHO MEAS - IVSD: 0.7 CM
BH CV ECHO MEAS - LAT PEAK E' VEL: 15.4 CM/SEC
BH CV ECHO MEAS - LV DIASTOLIC VOL/BSA (35-75): 47.2 CM2
BH CV ECHO MEAS - LV MASS(C)D: 108.5 GRAMS
BH CV ECHO MEAS - LV MAX PG: 4.4 MMHG
BH CV ECHO MEAS - LV MEAN PG: 2 MMHG
BH CV ECHO MEAS - LV SYSTOLIC VOL/BSA (12-30): 16.9 CM2
BH CV ECHO MEAS - LV V1 MAX: 105 CM/SEC
BH CV ECHO MEAS - LV V1 VTI: 18.8 CM
BH CV ECHO MEAS - LVIDD: 4.6 CM
BH CV ECHO MEAS - LVIDS: 2.8 CM
BH CV ECHO MEAS - LVOT AREA: 3.1 CM2
BH CV ECHO MEAS - LVOT DIAM: 1.98 CM
BH CV ECHO MEAS - LVPWD: 0.8 CM
BH CV ECHO MEAS - MED PEAK E' VEL: 11 CM/SEC
BH CV ECHO MEAS - MV A DUR: 0.12 SEC
BH CV ECHO MEAS - MV A MAX VEL: 46.3 CM/SEC
BH CV ECHO MEAS - MV DEC SLOPE: 291.9 CM/SEC2
BH CV ECHO MEAS - MV DEC TIME: 0.19 SEC
BH CV ECHO MEAS - MV E MAX VEL: 60.4 CM/SEC
BH CV ECHO MEAS - MV E/A: 1.3
BH CV ECHO MEAS - MV MAX PG: 1.7 MMHG
BH CV ECHO MEAS - MV MEAN PG: 0.66 MMHG
BH CV ECHO MEAS - MV P1/2T: 64 MSEC
BH CV ECHO MEAS - MV V2 VTI: 18 CM
BH CV ECHO MEAS - MVA(P1/2T): 3.4 CM2
BH CV ECHO MEAS - MVA(VTI): 3.2 CM2
BH CV ECHO MEAS - PA ACC TIME: 0.12 SEC
BH CV ECHO MEAS - PA V2 MAX: 107.2 CM/SEC
BH CV ECHO MEAS - PULM A REVS DUR: 0.08 SEC
BH CV ECHO MEAS - PULM A REVS VEL: 27.6 CM/SEC
BH CV ECHO MEAS - PULM DIAS VEL: 48.6 CM/SEC
BH CV ECHO MEAS - PULM S/D: 0.75
BH CV ECHO MEAS - PULM SYS VEL: 36.7 CM/SEC
BH CV ECHO MEAS - QP/QS: 0.74
BH CV ECHO MEAS - RAP SYSTOLE: 3 MMHG
BH CV ECHO MEAS - RV MAX PG: 1.98 MMHG
BH CV ECHO MEAS - RV V1 MAX: 70.3 CM/SEC
BH CV ECHO MEAS - RV V1 VTI: 14 CM
BH CV ECHO MEAS - RVOT DIAM: 1.97 CM
BH CV ECHO MEAS - RVSP: 24 MMHG
BH CV ECHO MEAS - SUP REN AO DIAM: 1.9 CM
BH CV ECHO MEAS - SV(LVOT): 58.1 ML
BH CV ECHO MEAS - SV(MOD-SP2): 66 ML
BH CV ECHO MEAS - SV(MOD-SP4): 68 ML
BH CV ECHO MEAS - SV(RVOT): 42.7 ML
BH CV ECHO MEAS - SVI(LVOT): 25.8 ML/M2
BH CV ECHO MEAS - SVI(MOD-SP2): 29.4 ML/M2
BH CV ECHO MEAS - SVI(MOD-SP4): 30.3 ML/M2
BH CV ECHO MEAS - TAPSE (>1.6): 2.19 CM
BH CV ECHO MEAS - TR MAX PG: 20.8 MMHG
BH CV ECHO MEAS - TR MAX VEL: 228 CM/SEC
BH CV ECHO MEASUREMENTS AVERAGE E/E' RATIO: 4.58
BH CV XLRA - RV BASE: 3 CM
BH CV XLRA - RV LENGTH: 7 CM
BH CV XLRA - RV MID: 2.5 CM
BH CV XLRA - TDI S': 12.1 CM/SEC
LEFT ATRIUM VOLUME INDEX: 13.1 ML/M2
LV EF BIPLANE MOD: 61.4 %
SINUS: 3 CM
STJ: 2.6 CM

## 2025-05-16 PROCEDURE — 93306 TTE W/DOPPLER COMPLETE: CPT

## 2025-05-16 PROCEDURE — 25510000001 PERFLUTREN 6.52 MG/ML SUSPENSION 2 ML VIAL: Performed by: STUDENT IN AN ORGANIZED HEALTH CARE EDUCATION/TRAINING PROGRAM

## 2025-05-16 RX ADMIN — PERFLUTREN 1.5 ML: 6.52 INJECTION, SUSPENSION INTRAVENOUS at 14:24

## 2025-05-28 DIAGNOSIS — R41.840 ATTENTION AND CONCENTRATION DEFICIT: Primary | Chronic | ICD-10-CM

## 2025-05-28 RX ORDER — METHYLPHENIDATE HYDROCHLORIDE 27 MG/1
27 TABLET ORAL EVERY MORNING
Qty: 30 TABLET | Refills: 0 | Status: SHIPPED | OUTPATIENT
Start: 2025-05-28 | End: 2025-05-30

## 2025-05-30 DIAGNOSIS — R41.840 ATTENTION AND CONCENTRATION DEFICIT: Primary | ICD-10-CM

## 2025-05-30 RX ORDER — METHYLPHENIDATE HYDROCHLORIDE 27 MG/1
27 TABLET ORAL EVERY MORNING
Qty: 30 TABLET | Refills: 0 | Status: SHIPPED | OUTPATIENT
Start: 2025-05-30

## 2025-06-17 ENCOUNTER — TELEPHONE (OUTPATIENT)
Dept: FAMILY MEDICINE CLINIC | Facility: CLINIC | Age: 25
End: 2025-06-17

## 2025-06-17 RX ORDER — HYDROXYZINE HYDROCHLORIDE 25 MG/1
50 TABLET, FILM COATED ORAL NIGHTLY PRN
Qty: 60 TABLET | Refills: 0 | Status: SHIPPED | OUTPATIENT
Start: 2025-06-17 | End: 2025-06-18 | Stop reason: SDUPTHER

## 2025-06-17 NOTE — TELEPHONE ENCOUNTER
Pharmacy Name: [facility] - [place], [place] - [address] - [phone] PH - [phone] FX     Pharmacy representative phone number: [phone]    What medication are you calling in regards to: hydrOXYzine (ATARAX) 25 MG tablet     What question does the pharmacy have: PHARMACY IS LOCATED IN [place], THEY DO NOT MAIL MEDICATIONS TO [place]. PRESCRIPTION HAS BEEN CANCELLED     Who is the provider that prescribed the medication: DR CRAMER

## 2025-06-18 RX ORDER — HYDROXYZINE HYDROCHLORIDE 25 MG/1
50 TABLET, FILM COATED ORAL NIGHTLY PRN
Qty: 60 TABLET | Refills: 0 | Status: SHIPPED | OUTPATIENT
Start: 2025-06-18

## 2025-07-03 DIAGNOSIS — R41.840 ATTENTION AND CONCENTRATION DEFICIT: ICD-10-CM

## 2025-07-03 RX ORDER — METHYLPHENIDATE HYDROCHLORIDE 27 MG/1
27 TABLET ORAL EVERY MORNING
Qty: 30 TABLET | Refills: 0 | Status: CANCELLED | OUTPATIENT
Start: 2025-07-03

## 2025-07-10 DIAGNOSIS — R41.840 ATTENTION AND CONCENTRATION DEFICIT: ICD-10-CM

## 2025-07-11 RX ORDER — METHYLPHENIDATE HYDROCHLORIDE 27 MG/1
27 TABLET ORAL EVERY MORNING
Qty: 30 TABLET | Refills: 0 | Status: SHIPPED | OUTPATIENT
Start: 2025-07-11

## (undated) DEVICE — DISPOSABLE TOURNIQUET CUFF SINGLE BLADDER, SINGLE PORT AND QUICK CONNECT CONNECTOR: Brand: COLOR CUFF

## (undated) DEVICE — IMPLANTABLE DEVICE
Type: IMPLANTABLE DEVICE | Site: ANKLE | Status: NON-FUNCTIONAL
Brand: ORTHOLOC
Removed: 2022-05-16

## (undated) DEVICE — GLV SURG BIOGEL LTX PF 8

## (undated) DEVICE — SPNG GZ WOVN 4X4IN 12PLY 10/BX STRL

## (undated) DEVICE — DRP C/ARMOR

## (undated) DEVICE — NDL HYPO ECLPS SFTY 18G 1 1/2IN

## (undated) DEVICE — GOWN,REINF,POLY,SIRUS,BREATH SLV,XLNG/XL: Brand: MEDLINE

## (undated) DEVICE — PIN FIX BB TAK THRD

## (undated) DEVICE — GLV SURG PREMIERPRO ORTHO LTX PF SZ8 BRN

## (undated) DEVICE — KT INST FOR FIBERTAKDX SUT/ANCH W/GW 1.6MM 1.5MM DISP

## (undated) DEVICE — PATIENT RETURN ELECTRODE, SINGLE-USE, CONTACT QUALITY MONITORING, ADULT, WITH 9FT CORD, FOR PATIENTS WEIGING OVER 33LBS. (15KG): Brand: MEGADYNE

## (undated) DEVICE — APPL CHLORAPREP HI/LITE 26ML ORNG

## (undated) DEVICE — SPLNT PLSTR ORTHO 5IN

## (undated) DEVICE — INTENDED FOR TISSUE SEPARATION, AND OTHER PROCEDURES THAT REQUIRE A SHARP SURGICAL BLADE TO PUNCTURE OR CUT.: Brand: BARD-PARKER ® CARBON RIB-BACK BLADES

## (undated) DEVICE — UNDERCAST PADDING: Brand: DEROYAL

## (undated) DEVICE — PK ORTHO MINOR TOWER 40

## (undated) DEVICE — COVER,TABLE,44X90,STERILE: Brand: MEDLINE

## (undated) DEVICE — DRP C/ARM 41X74IN

## (undated) DEVICE — DRILL BIT

## (undated) DEVICE — SOL ISO/ALC RUB 70PCT 4OZ

## (undated) DEVICE — DRILL BIT: Brand: ORTHOLOC 3DI

## (undated) DEVICE — BNDG ELAS ELITE V/CLOSE 4IN 5YD LF STRL

## (undated) DEVICE — SUT VIC 2/0 CT2 27IN J269H

## (undated) DEVICE — PENCL E/S ULTRAVAC TELESCP NOSE HOLSTR 10FT

## (undated) DEVICE — DRAPE,U/ SHT,SPLIT,PLAS,STERIL: Brand: MEDLINE

## (undated) DEVICE — BNDG ELAS ELITE V/CLOSE 6IN 5YD LF STRL

## (undated) DEVICE — GLV SURG SIGNATURE ESSENTIAL PF LTX SZ8

## (undated) DEVICE — SPNG LAP 18X18IN LF STRL PK/5

## (undated) DEVICE — TRAP FLD MINIVAC MEGADYNE 100ML

## (undated) DEVICE — NEEDLE, QUINCKE, 18GX3.5": Brand: MEDLINE

## (undated) DEVICE — SUT VIC 3/0 CT2 27IN J232H

## (undated) DEVICE — TBG PUMP ARTHSCP MAIN AR6400 16FT

## (undated) DEVICE — GUHL ANKLE DISTRACTOR FOOT STRAPS,                                    STERILE, LATEX FREE BOX OF 6

## (undated) DEVICE — DRILL BIT, SOLID CORE, 2.8MM: Brand: MEDLINE

## (undated) DEVICE — DRSNG GZ CURAD XEROFORM NONADHS 5X9IN STRL

## (undated) DEVICE — TBG PENCL TELESCP MEGADYNE SMOKE EVAC 10FT

## (undated) DEVICE — GOWN,PREVENTION PLUS,XLONG/XLARGE,STRL: Brand: MEDLINE

## (undated) DEVICE — BIT DRL TI 2.5MM

## (undated) DEVICE — PROXIMATE RH ROTATING HEAD SKIN STAPLERS (35 WIDE) CONTAINS 35 STAINLESS STEEL STAPLES: Brand: PROXIMATE

## (undated) DEVICE — STCKNT IMPERV 12IN STRL

## (undated) DEVICE — PAD,ABDOMINAL,8"X10",ST,LF: Brand: MEDLINE

## (undated) DEVICE — HEADED DRILL BIT: Brand: DARCO

## (undated) DEVICE — BNDG ELAS CO-FLEX SLF ADHR 4IN5YD LF STRL

## (undated) DEVICE — IMPLANTABLE DEVICE
Type: IMPLANTABLE DEVICE | Site: ANKLE | Status: NON-FUNCTIONAL
Brand: ORTHOLOC 3DI
Removed: 2022-05-16

## (undated) DEVICE — TEMP FIX PIN THREADED: Brand: ORTHOLOC 3DI

## (undated) DEVICE — SUT ETHLN 3/0 PS1 18IN 1663H

## (undated) DEVICE — DRSNG GZ PETROLTM XEROFORM CURAD 1X8IN STRL

## (undated) DEVICE — 8MM BONE GRAFT DRILL: Brand: ACUMED

## (undated) DEVICE — PIN, TEMP FIX, SMOOTH, 1.1 X 15MM: Brand: PENDING

## (undated) DEVICE — DYNAMIC COMPRESSION SYSTEM: Brand: EASYFUSE

## (undated) DEVICE — BIT DRL 2.5X110MM

## (undated) DEVICE — BLD DISSCT COOL CUT SJ 3MM 7CM

## (undated) DEVICE — HEADED K-WIRE W/ LONG SMOOTH TIP
Type: IMPLANTABLE DEVICE | Site: ANKLE | Status: NON-FUNCTIONAL
Brand: DARCO
Removed: 2022-05-16